# Patient Record
Sex: FEMALE | Race: WHITE | Employment: PART TIME | URBAN - NONMETROPOLITAN AREA
[De-identification: names, ages, dates, MRNs, and addresses within clinical notes are randomized per-mention and may not be internally consistent; named-entity substitution may affect disease eponyms.]

---

## 2017-01-04 ENCOUNTER — TELEPHONE (OUTPATIENT)
Dept: FAMILY MEDICINE CLINIC | Age: 62
End: 2017-01-04

## 2017-01-04 RX ORDER — ATORVASTATIN CALCIUM 20 MG/1
20 TABLET, FILM COATED ORAL DAILY
Qty: 90 TABLET | Refills: 1 | Status: SHIPPED | OUTPATIENT
Start: 2017-01-04 | End: 2017-05-10 | Stop reason: SDUPTHER

## 2017-05-10 ENCOUNTER — OFFICE VISIT (OUTPATIENT)
Dept: FAMILY MEDICINE CLINIC | Age: 62
End: 2017-05-10

## 2017-05-10 VITALS
WEIGHT: 185.8 LBS | BODY MASS INDEX: 30.96 KG/M2 | HEART RATE: 68 BPM | DIASTOLIC BLOOD PRESSURE: 86 MMHG | SYSTOLIC BLOOD PRESSURE: 132 MMHG | HEIGHT: 65 IN

## 2017-05-10 DIAGNOSIS — E78.00 PURE HYPERCHOLESTEROLEMIA: Primary | ICD-10-CM

## 2017-05-10 DIAGNOSIS — I10 ESSENTIAL HYPERTENSION: ICD-10-CM

## 2017-05-10 DIAGNOSIS — M15.9 PRIMARY OSTEOARTHRITIS INVOLVING MULTIPLE JOINTS: ICD-10-CM

## 2017-05-10 DIAGNOSIS — Z12.11 COLON CANCER SCREENING: ICD-10-CM

## 2017-05-10 PROCEDURE — 3017F COLORECTAL CA SCREEN DOC REV: CPT | Performed by: FAMILY MEDICINE

## 2017-05-10 PROCEDURE — 99213 OFFICE O/P EST LOW 20 MIN: CPT | Performed by: FAMILY MEDICINE

## 2017-05-10 PROCEDURE — 3014F SCREEN MAMMO DOC REV: CPT | Performed by: FAMILY MEDICINE

## 2017-05-10 PROCEDURE — G8417 CALC BMI ABV UP PARAM F/U: HCPCS | Performed by: FAMILY MEDICINE

## 2017-05-10 PROCEDURE — G8427 DOCREV CUR MEDS BY ELIG CLIN: HCPCS | Performed by: FAMILY MEDICINE

## 2017-05-10 PROCEDURE — 1036F TOBACCO NON-USER: CPT | Performed by: FAMILY MEDICINE

## 2017-05-10 RX ORDER — IRBESARTAN AND HYDROCHLOROTHIAZIDE 150; 12.5 MG/1; MG/1
1 TABLET, FILM COATED ORAL DAILY
Qty: 90 TABLET | Refills: 1 | Status: SHIPPED | OUTPATIENT
Start: 2017-05-10 | End: 2017-11-06 | Stop reason: SDUPTHER

## 2017-05-10 RX ORDER — ATORVASTATIN CALCIUM 20 MG/1
20 TABLET, FILM COATED ORAL DAILY
Qty: 90 TABLET | Refills: 1 | Status: SHIPPED | OUTPATIENT
Start: 2017-05-10 | End: 2017-05-12 | Stop reason: SDUPTHER

## 2017-05-12 ENCOUNTER — TELEPHONE (OUTPATIENT)
Dept: FAMILY MEDICINE CLINIC | Age: 62
End: 2017-05-12

## 2017-05-12 RX ORDER — ATORVASTATIN CALCIUM 40 MG/1
40 TABLET, FILM COATED ORAL DAILY
Qty: 90 TABLET | Refills: 1 | Status: SHIPPED | OUTPATIENT
Start: 2017-05-12 | End: 2018-01-15 | Stop reason: SDUPTHER

## 2017-05-17 ENCOUNTER — TELEPHONE (OUTPATIENT)
Dept: FAMILY MEDICINE CLINIC | Age: 62
End: 2017-05-17

## 2017-05-17 RX ORDER — AZITHROMYCIN 250 MG/1
TABLET, FILM COATED ORAL
Qty: 1 PACKET | Refills: 0 | Status: SHIPPED | OUTPATIENT
Start: 2017-05-17 | End: 2017-05-27

## 2017-05-24 PROCEDURE — 82274 ASSAY TEST FOR BLOOD FECAL: CPT | Performed by: FAMILY MEDICINE

## 2017-05-25 DIAGNOSIS — Z12.11 COLON CANCER SCREENING: ICD-10-CM

## 2017-05-25 LAB
CONTROL: PRESENT
HEMOCCULT STL QL: NEGATIVE

## 2017-05-26 ENCOUNTER — TELEPHONE (OUTPATIENT)
Dept: FAMILY MEDICINE CLINIC | Age: 62
End: 2017-05-26

## 2017-11-28 ENCOUNTER — TELEPHONE (OUTPATIENT)
Dept: FAMILY MEDICINE CLINIC | Age: 62
End: 2017-11-28

## 2017-11-28 ENCOUNTER — OFFICE VISIT (OUTPATIENT)
Dept: FAMILY MEDICINE CLINIC | Age: 62
End: 2017-11-28
Payer: COMMERCIAL

## 2017-11-28 VITALS
HEART RATE: 64 BPM | HEIGHT: 62 IN | WEIGHT: 191.8 LBS | SYSTOLIC BLOOD PRESSURE: 124 MMHG | TEMPERATURE: 98.6 F | BODY MASS INDEX: 35.3 KG/M2 | DIASTOLIC BLOOD PRESSURE: 86 MMHG

## 2017-11-28 DIAGNOSIS — J20.8 ACUTE BRONCHITIS DUE TO OTHER SPECIFIED ORGANISMS: Primary | ICD-10-CM

## 2017-11-28 PROCEDURE — G8417 CALC BMI ABV UP PARAM F/U: HCPCS | Performed by: FAMILY MEDICINE

## 2017-11-28 PROCEDURE — G8427 DOCREV CUR MEDS BY ELIG CLIN: HCPCS | Performed by: FAMILY MEDICINE

## 2017-11-28 PROCEDURE — G8484 FLU IMMUNIZE NO ADMIN: HCPCS | Performed by: FAMILY MEDICINE

## 2017-11-28 PROCEDURE — 3014F SCREEN MAMMO DOC REV: CPT | Performed by: FAMILY MEDICINE

## 2017-11-28 PROCEDURE — 99213 OFFICE O/P EST LOW 20 MIN: CPT | Performed by: FAMILY MEDICINE

## 2017-11-28 PROCEDURE — 3017F COLORECTAL CA SCREEN DOC REV: CPT | Performed by: FAMILY MEDICINE

## 2017-11-28 PROCEDURE — 1036F TOBACCO NON-USER: CPT | Performed by: FAMILY MEDICINE

## 2017-11-28 RX ORDER — AZITHROMYCIN 250 MG/1
TABLET, FILM COATED ORAL
Qty: 1 PACKET | Refills: 0 | Status: SHIPPED | OUTPATIENT
Start: 2017-11-28 | End: 2018-01-15

## 2017-11-28 ASSESSMENT — ENCOUNTER SYMPTOMS
SHORTNESS OF BREATH: 0
COUGH: 1
WHEEZING: 0

## 2017-11-28 NOTE — PROGRESS NOTES
[facility] PROFESSIONAL SERVS  [facility]  [address] [address]  Dept: [phone]  Dept Fax: [phone]  Loc: [phone]  PROGRESS NOTE      Visit Date: 11/28/2017    Miah Reid is a 63 y.o. female who presents today for:  Chief Complaint   Patient presents with    Cough     phlem yellow green    Otalgia     bilateral       Subjective:  HPI     Sore throat and chest congestion. Bilateral ear pain with some lightheadedness which has resolved. Productive cough yellow phlegm. Tried vicks rub and tylenol. Started 4 days ago. Review of Systems   Constitutional: Positive for chills. Negative for fever. Respiratory: Positive for cough. Negative for shortness of breath and wheezing. Past Medical History:   Diagnosis Date    Depression     Hyperlipidemia     Hypertension       Past Surgical History:   Procedure Laterality Date    CHOLECYSTECTOMY       History reviewed. No pertinent family history. Social History   Substance Use Topics    Smoking status: Never Smoker    Smokeless tobacco: Never Used    Alcohol use No      Current Outpatient Prescriptions   Medication Sig Dispense Refill    irbesartan-hydrochlorothiazide (AVALIDE) 150-12.5 MG per tablet TAKE 1 TABLET DAILY 90 tablet 0    atorvastatin (LIPITOR) 40 MG tablet Take 1 tablet by mouth daily 90 tablet 1     No current facility-administered medications for this visit.       Allergies   Allergen Reactions    Ace Inhibitors Other (See Comments)     cough    Diovan Hct [Valsartan-Hydrochlorothiazide]      Hands numbed up    Mevacor [Lovastatin] Other (See Comments)     myalgia    Wellbutrin [Bupropion] Anxiety     Health Maintenance   Topic Date Due    Hepatitis C screen  1955    HIV screen  05/29/1972    Diabetes screen  05/29/1997    DTaP/Tdap/Td vaccine (1 - Tdap) 06/20/1998    Cervical cancer screen  05/07/2015    Breast cancer screen  10/02/2016    Zostavax vaccine 2017    Flu vaccine (1) 2017    Colon Cancer Screen FIT/FOBT  2018    Lipid screen  2022       Objective:     /86 (Site: Left Arm, Position: Sitting, Cuff Size: Medium Adult)   Pulse 64   Temp 98.6 °F (37 °C) (Oral)   Ht 5' 2\" (1.575 m)   Wt 191 lb 12.8 oz (87 kg)   BMI 35.08 kg/m²   Physical Exam   Constitutional: She is oriented to person, place, and time. She appears well-developed and well-nourished. HENT:   Right Ear: External ear normal.   Left Ear: External ear normal.   Mouth/Throat: Oropharynx is clear and moist. No oropharyngeal exudate. Cardiovascular: Normal rate and regular rhythm. No murmur heard. Pulmonary/Chest: Effort normal and breath sounds normal. No respiratory distress. She has no wheezes. Neurological: She is alert and oriented to person, place, and time. Psychiatric: She has a normal mood and affect. Her behavior is normal.   Vitals reviewed. Harsh cough    Impression/Plan:  1. Acute bronchitis due to other specified organisms  New problem. Unlikely PNA. Will treat with z-rula  - azithromycin (ZITHROMAX) 250 MG tablet; Take 2 tabs (500 mg) on Day 1, and take 1 tab (250 mg) on days 2 through 5. Dispense: 1 packet; Refill: 0      They voiced understanding. All questions answered. They agreed with treatment plan. Educational materials given - see patient instructions. Discussed use, benefit, and side effects of prescribed medications. Reviewed health maintenance. Return if symptoms worsen or fail to improve.        Electronically signed by Drew Godoy MD on 2017 at 2:36 PM

## 2017-11-28 NOTE — PATIENT INSTRUCTIONS
good.  When should you call for help? Call 911 anytime you think you may need emergency care. For example, call if:  · You have severe trouble breathing. Call your doctor now or seek immediate medical care if:  · You have new or worse trouble breathing. · You cough up dark brown or bloody mucus (sputum). · You have a new or higher fever. · You have a new rash. Watch closely for changes in your health, and be sure to contact your doctor if:  · You cough more deeply or more often, especially if you notice more mucus or a change in the color of your mucus. · You are not getting better as expected. Where can you learn more? Go to https://LifeIMAGE.Social Media Networks. org and sign in to your [employer] account. Enter H333 in the FirmPlay box to learn more about \"Bronchitis: Care Instructions. \"     If you do not have an account, please click on the \"Sign Up Now\" link. Current as of: March 25, 2017  Content Version: 11.3  © 3865-6068 [employer]. Care instructions adapted under license by [facility] ([facility]). If you have questions about a medical condition or this instruction, always ask your healthcare professional. [place] [address] any warranty or liability for your use of this information.

## 2018-01-15 ENCOUNTER — OFFICE VISIT (OUTPATIENT)
Dept: FAMILY MEDICINE CLINIC | Age: 63
End: 2018-01-15
Payer: COMMERCIAL

## 2018-01-15 VITALS
SYSTOLIC BLOOD PRESSURE: 150 MMHG | DIASTOLIC BLOOD PRESSURE: 90 MMHG | HEART RATE: 66 BPM | WEIGHT: 194 LBS | BODY MASS INDEX: 35.7 KG/M2 | HEIGHT: 62 IN

## 2018-01-15 DIAGNOSIS — I10 ESSENTIAL HYPERTENSION: Primary | ICD-10-CM

## 2018-01-15 DIAGNOSIS — Z12.31 ENCOUNTER FOR SCREENING MAMMOGRAM FOR BREAST CANCER: ICD-10-CM

## 2018-01-15 DIAGNOSIS — E78.00 PURE HYPERCHOLESTEROLEMIA: Chronic | ICD-10-CM

## 2018-01-15 PROCEDURE — 3017F COLORECTAL CA SCREEN DOC REV: CPT | Performed by: FAMILY MEDICINE

## 2018-01-15 PROCEDURE — G8417 CALC BMI ABV UP PARAM F/U: HCPCS | Performed by: FAMILY MEDICINE

## 2018-01-15 PROCEDURE — 99214 OFFICE O/P EST MOD 30 MIN: CPT | Performed by: FAMILY MEDICINE

## 2018-01-15 PROCEDURE — 3014F SCREEN MAMMO DOC REV: CPT | Performed by: FAMILY MEDICINE

## 2018-01-15 PROCEDURE — G8427 DOCREV CUR MEDS BY ELIG CLIN: HCPCS | Performed by: FAMILY MEDICINE

## 2018-01-15 PROCEDURE — 1036F TOBACCO NON-USER: CPT | Performed by: FAMILY MEDICINE

## 2018-01-15 PROCEDURE — G8484 FLU IMMUNIZE NO ADMIN: HCPCS | Performed by: FAMILY MEDICINE

## 2018-01-15 RX ORDER — IRBESARTAN AND HYDROCHLOROTHIAZIDE 150; 12.5 MG/1; MG/1
TABLET, FILM COATED ORAL
Qty: 90 TABLET | Refills: 1 | Status: SHIPPED | OUTPATIENT
Start: 2018-01-15 | End: 2018-06-27 | Stop reason: SDUPTHER

## 2018-01-15 RX ORDER — ATORVASTATIN CALCIUM 40 MG/1
40 TABLET, FILM COATED ORAL DAILY
Qty: 90 TABLET | Refills: 1 | Status: SHIPPED | OUTPATIENT
Start: 2018-01-15 | End: 2018-07-18 | Stop reason: SDUPTHER

## 2018-01-15 ASSESSMENT — ENCOUNTER SYMPTOMS
VOMITING: 0
CHEST TIGHTNESS: 0
NAUSEA: 0
EYE REDNESS: 0
COLOR CHANGE: 0
EYE DISCHARGE: 0
SHORTNESS OF BREATH: 0

## 2018-01-15 ASSESSMENT — PATIENT HEALTH QUESTIONNAIRE - PHQ9
1. LITTLE INTEREST OR PLEASURE IN DOING THINGS: 0
2. FEELING DOWN, DEPRESSED OR HOPELESS: 0
SUM OF ALL RESPONSES TO PHQ9 QUESTIONS 1 & 2: 0
SUM OF ALL RESPONSES TO PHQ QUESTIONS 1-9: 0

## 2018-01-15 NOTE — PROGRESS NOTES
daily    Encounter for screening mammogram for breast cancer  -     [facility] DIGITAL SCREEN W CAD BILATERAL; Future    BP is elevated today, but patient believes it is because of a new stressor today. Will continue on current medication and have patient return in a week for a nurse BP check. Order for screening mammogram given. Lisa received counseling on the following healthy behaviors: nutrition, exercise and medication adherence  Reviewed prior labs and health maintenance  Continue current medications, diet and exercise. Discussed use, benefit, and side effects of prescribed medications. Barriers to medication compliance addressed. Patient given educational materials - see patient instructions    Requested Prescriptions     Signed Prescriptions Disp Refills    irbesartan-hydrochlorothiazide (AVALIDE) 150-12.5 MG per tablet 90 tablet 1     Sig: TAKE 1 TABLET DAILY    atorvastatin (LIPITOR) 40 MG tablet 90 tablet 1     Sig: Take 1 tablet by mouth daily       All patient questions answered. Patient voiced understanding. Quality Measures    Body mass index is 35.48 kg/m². Elevated. Weight control planned discussed Healthy diet and regular exercise. BP: (!) 150/90 Blood pressure is elevated. Treatment plan consists of No treatment change needed. Will monitor closely. Lab Results   Component Value Date    LDLCALC 86 11/04/2017    (goal LDL reduction with dx if diabetes is 50% LDL reduction)      PHQ Scores 1/15/2018 5/10/2016   PHQ2 Score 0 1   PHQ9 Score 0 1     Interpretation of Total Score Depression Severity: 1-4 = Minimal depression, 5-9 = Mild depression, 10-14 = Moderate depression, 15-19 = Moderately severe depression, 20-27 = Severe depression    Return in about 1 week (around 1/22/2018) for nurse BP check; 6 months with PCP.     Electronically signed by Arianna Levy MD on 1/15/2018 at 4:04 PM

## 2018-03-14 ENCOUNTER — OFFICE VISIT (OUTPATIENT)
Dept: FAMILY MEDICINE CLINIC | Age: 63
End: 2018-03-14
Payer: COMMERCIAL

## 2018-03-14 VITALS
HEART RATE: 80 BPM | DIASTOLIC BLOOD PRESSURE: 82 MMHG | WEIGHT: 187.6 LBS | HEIGHT: 65 IN | BODY MASS INDEX: 31.25 KG/M2 | SYSTOLIC BLOOD PRESSURE: 146 MMHG

## 2018-03-14 DIAGNOSIS — R73.01 ELEVATED FASTING GLUCOSE: ICD-10-CM

## 2018-03-14 DIAGNOSIS — F41.9 ANXIETY: Primary | ICD-10-CM

## 2018-03-14 PROCEDURE — 99213 OFFICE O/P EST LOW 20 MIN: CPT | Performed by: FAMILY MEDICINE

## 2018-03-14 PROCEDURE — G8427 DOCREV CUR MEDS BY ELIG CLIN: HCPCS | Performed by: FAMILY MEDICINE

## 2018-03-14 PROCEDURE — 3014F SCREEN MAMMO DOC REV: CPT | Performed by: FAMILY MEDICINE

## 2018-03-14 PROCEDURE — G8417 CALC BMI ABV UP PARAM F/U: HCPCS | Performed by: FAMILY MEDICINE

## 2018-03-14 PROCEDURE — 3017F COLORECTAL CA SCREEN DOC REV: CPT | Performed by: FAMILY MEDICINE

## 2018-03-14 PROCEDURE — G8484 FLU IMMUNIZE NO ADMIN: HCPCS | Performed by: FAMILY MEDICINE

## 2018-03-14 PROCEDURE — 1036F TOBACCO NON-USER: CPT | Performed by: FAMILY MEDICINE

## 2018-03-14 RX ORDER — FLUOXETINE 10 MG/1
10 CAPSULE ORAL DAILY
Qty: 30 CAPSULE | Refills: 0 | Status: SHIPPED | OUTPATIENT
Start: 2018-03-14 | End: 2018-04-09 | Stop reason: SDUPTHER

## 2018-03-17 LAB
AVERAGE GLUCOSE: 131 MG/DL (ref 66–114)
HBA1C MFR BLD: 6.2 % (ref 4.2–5.8)
TSH SERPL DL<=0.05 MIU/L-ACNC: 1.96 UIU/ML (ref 0.4–4.1)

## 2018-03-19 PROBLEM — R73.03 PRE-DIABETES: Status: ACTIVE | Noted: 2018-03-19

## 2018-04-09 DIAGNOSIS — F41.9 ANXIETY: ICD-10-CM

## 2018-04-09 RX ORDER — FLUOXETINE 10 MG/1
10 CAPSULE ORAL DAILY
Qty: 90 CAPSULE | Refills: 0 | Status: SHIPPED | OUTPATIENT
Start: 2018-04-09 | End: 2018-04-18 | Stop reason: SDUPTHER

## 2018-04-18 ENCOUNTER — OFFICE VISIT (OUTPATIENT)
Dept: FAMILY MEDICINE CLINIC | Age: 63
End: 2018-04-18
Payer: COMMERCIAL

## 2018-04-18 VITALS
SYSTOLIC BLOOD PRESSURE: 130 MMHG | DIASTOLIC BLOOD PRESSURE: 70 MMHG | BODY MASS INDEX: 31.56 KG/M2 | HEIGHT: 65 IN | WEIGHT: 189.4 LBS | HEART RATE: 76 BPM

## 2018-04-18 DIAGNOSIS — F41.9 ANXIETY: ICD-10-CM

## 2018-04-18 PROCEDURE — G8427 DOCREV CUR MEDS BY ELIG CLIN: HCPCS | Performed by: FAMILY MEDICINE

## 2018-04-18 PROCEDURE — 99213 OFFICE O/P EST LOW 20 MIN: CPT | Performed by: FAMILY MEDICINE

## 2018-04-18 PROCEDURE — G8417 CALC BMI ABV UP PARAM F/U: HCPCS | Performed by: FAMILY MEDICINE

## 2018-04-18 PROCEDURE — 3014F SCREEN MAMMO DOC REV: CPT | Performed by: FAMILY MEDICINE

## 2018-04-18 PROCEDURE — 1036F TOBACCO NON-USER: CPT | Performed by: FAMILY MEDICINE

## 2018-04-18 PROCEDURE — 3017F COLORECTAL CA SCREEN DOC REV: CPT | Performed by: FAMILY MEDICINE

## 2018-04-18 RX ORDER — FLUOXETINE 10 MG/1
10 CAPSULE ORAL DAILY
Qty: 90 CAPSULE | Refills: 0 | Status: SHIPPED | OUTPATIENT
Start: 2018-04-18 | End: 2018-07-04

## 2018-06-27 DIAGNOSIS — I10 ESSENTIAL HYPERTENSION: ICD-10-CM

## 2018-06-27 RX ORDER — IRBESARTAN AND HYDROCHLOROTHIAZIDE 150; 12.5 MG/1; MG/1
TABLET, FILM COATED ORAL
Qty: 90 TABLET | Refills: 1 | Status: SHIPPED | OUTPATIENT
Start: 2018-06-27 | End: 2018-12-24 | Stop reason: SDUPTHER

## 2018-07-04 ENCOUNTER — HOSPITAL ENCOUNTER (EMERGENCY)
Age: 63
Discharge: HOME | End: 2018-07-04
Payer: COMMERCIAL

## 2018-07-04 VITALS
OXYGEN SATURATION: 96 % | BODY MASS INDEX: 30.55 KG/M2 | WEIGHT: 185 LBS | SYSTOLIC BLOOD PRESSURE: 169 MMHG | DIASTOLIC BLOOD PRESSURE: 89 MMHG | RESPIRATION RATE: 20 BRPM | HEART RATE: 72 BPM | TEMPERATURE: 97.8 F

## 2018-07-04 DIAGNOSIS — M17.12 OSTEOARTHRITIS OF LEFT KNEE, UNSPECIFIED OSTEOARTHRITIS TYPE: Primary | ICD-10-CM

## 2018-07-04 PROCEDURE — 99212 OFFICE O/P EST SF 10 MIN: CPT | Performed by: NURSE PRACTITIONER

## 2018-07-04 PROCEDURE — 99213 OFFICE O/P EST LOW 20 MIN: CPT

## 2018-07-04 RX ORDER — LIDOCAINE 50 MG/G
OINTMENT TOPICAL
Qty: 1 TUBE | Refills: 0 | Status: SHIPPED | OUTPATIENT
Start: 2018-07-04 | End: 2019-01-16 | Stop reason: ALTCHOICE

## 2018-07-04 RX ORDER — TRAMADOL HYDROCHLORIDE 50 MG/1
50 TABLET ORAL EVERY 6 HOURS PRN
Qty: 12 TABLET | Refills: 0 | Status: SHIPPED | OUTPATIENT
Start: 2018-07-04 | End: 2018-07-07

## 2018-07-04 ASSESSMENT — PAIN DESCRIPTION - DESCRIPTORS: DESCRIPTORS: SHOOTING

## 2018-07-04 ASSESSMENT — PAIN DESCRIPTION - LOCATION: LOCATION: KNEE

## 2018-07-04 ASSESSMENT — PAIN SCALES - GENERAL: PAINLEVEL_OUTOF10: 10

## 2018-07-04 ASSESSMENT — PAIN DESCRIPTION - ORIENTATION: ORIENTATION: LEFT

## 2018-07-04 ASSESSMENT — ENCOUNTER SYMPTOMS
CHOKING: 0
SHORTNESS OF BREATH: 0
VOMITING: 0
NAUSEA: 0
ABDOMINAL PAIN: 0

## 2018-07-04 ASSESSMENT — PAIN DESCRIPTION - PAIN TYPE: TYPE: ACUTE PAIN

## 2018-07-04 NOTE — ED PROVIDER NOTES
[facility]  Urgent Care Encounter       CHIEF COMPLAINT       Chief Complaint   Patient presents with    Knee Pain     Pt having pain in left knee. Getting worse. Nurses Notes reviewed and I agree except as noted in the HPI. HISTORY OF PRESENT ILLNESS   Sotero Garcia is a 66 y.o. female who presents For evaluation of left knee pain. Patient reports that this has been an ongoing issue for multiple ones, however the symptoms seem to have increased over the past 2 weeks. She states that she has an appointment [facility] [place] that is scheduled for 8 days from now. She reports that the pain gets worse the more she is on her leg she has been using ibuprofen, muscle rubs, ice and heat at home without much relief. She denies any injury or trauma to the knee. The history is provided by the patient. REVIEW OF SYSTEMS     Review of Systems   Constitutional: Negative for chills and fever. Respiratory: Negative for choking and shortness of breath. Cardiovascular: Negative for chest pain. Gastrointestinal: Negative for abdominal pain, nausea and vomiting. Genitourinary: Negative for dysuria and urgency. Musculoskeletal: Positive for arthralgias and gait problem. Skin: Negative for rash. Neurological: Negative for weakness and numbness. PAST MEDICAL HISTORY         Diagnosis Date    Depression     Hyperlipidemia     Hypertension        SURGICAL HISTORY     Patient  has a past surgical history that includes Cholecystectomy. CURRENT MEDICATIONS       Previous Medications    ATORVASTATIN (LIPITOR) 40 MG TABLET    Take 1 tablet by mouth daily    IRBESARTAN-HYDROCHLOROTHIAZIDE (AVALIDE) 150-12.5 MG PER TABLET    TAKE 1 TABLET DAILY       ALLERGIES     Patient is is allergic to ace inhibitors; diovan hct [valsartan-hydrochlorothiazide]; mevacor [lovastatin]; and wellbutrin [bupropion].     Patients   Immunization History   Administered Date(s) Administered    Td 06/19/1998       FAMILY HISTORY     Patient's family history is not on file. SOCIAL HISTORY     Patient  reports that she has never smoked. She has never used smokeless tobacco. She reports that she does not drink alcohol or use drugs. PHYSICAL EXAM     ED TRIAGE VITALS  BP: (!) 169/89, Temp: 97.8 °F (36.6 °C), Pulse: 72, Resp: 20, SpO2: 96 %,Estimated body mass index is 30.55 kg/m² as calculated from the following:    Height as of 4/18/18: 5' 5.25\" (1.657 m). Weight as of this encounter: 185 lb (83.9 kg). ,No LMP recorded. Patient is postmenopausal.    Physical Exam   Constitutional: She is oriented to person, place, and time. She appears well-developed and well-nourished. No distress. Pulmonary/Chest: Effort normal. No respiratory distress. Musculoskeletal:        Left knee: She exhibits normal range of motion and no swelling. Tenderness found. Medial joint line tenderness noted. Neurological: She is alert and oriented to person, place, and time. No cranial nerve deficit. Skin: Skin is warm and dry. No rash noted. She is not diaphoretic. Psychiatric: She has a normal mood and affect. Nursing note and vitals reviewed. DIAGNOSTIC RESULTS   Labs:No results found for this visit on 07/04/18. IMAGING:    No orders to display         EKG:None  URGENT CARE COURSE:     Vitals:    07/04/18 1209   BP: (!) 169/89   Pulse: 72   Resp: 20   Temp: 97.8 °F (36.6 °C)   TempSrc: Oral   SpO2: 96%   Weight: 185 lb (83.9 kg)       Medications - No data to display         PROCEDURES:  None    FINAL IMPRESSION      1. Osteoarthritis of right knee, unspecified osteoarthritis type          DISPOSITION/PLAN   DISPOSITION Decision To Discharge 07/04/2018 12:29:47 PM    Physical exam patient history are consistent with osteoarthritis type knee pain. The patient is instructed to continue taking Tylenol and ibuprofen at home and will be given a prescription for lidocaine ointment.   She is also

## 2018-07-18 ENCOUNTER — OFFICE VISIT (OUTPATIENT)
Dept: FAMILY MEDICINE CLINIC | Age: 63
End: 2018-07-18
Payer: COMMERCIAL

## 2018-07-18 VITALS
BODY MASS INDEX: 31.89 KG/M2 | HEIGHT: 65 IN | WEIGHT: 191.4 LBS | HEART RATE: 76 BPM | DIASTOLIC BLOOD PRESSURE: 84 MMHG | SYSTOLIC BLOOD PRESSURE: 134 MMHG

## 2018-07-18 DIAGNOSIS — R73.03 PRE-DIABETES: ICD-10-CM

## 2018-07-18 DIAGNOSIS — Z12.11 SCREENING FOR COLON CANCER: ICD-10-CM

## 2018-07-18 DIAGNOSIS — I10 ESSENTIAL HYPERTENSION: Primary | ICD-10-CM

## 2018-07-18 DIAGNOSIS — E78.00 PURE HYPERCHOLESTEROLEMIA: Chronic | ICD-10-CM

## 2018-07-18 PROCEDURE — G8417 CALC BMI ABV UP PARAM F/U: HCPCS | Performed by: FAMILY MEDICINE

## 2018-07-18 PROCEDURE — G8427 DOCREV CUR MEDS BY ELIG CLIN: HCPCS | Performed by: FAMILY MEDICINE

## 2018-07-18 PROCEDURE — 3017F COLORECTAL CA SCREEN DOC REV: CPT | Performed by: FAMILY MEDICINE

## 2018-07-18 PROCEDURE — 1036F TOBACCO NON-USER: CPT | Performed by: FAMILY MEDICINE

## 2018-07-18 PROCEDURE — 99213 OFFICE O/P EST LOW 20 MIN: CPT | Performed by: FAMILY MEDICINE

## 2018-07-18 RX ORDER — IRBESARTAN AND HYDROCHLOROTHIAZIDE 150; 12.5 MG/1; MG/1
TABLET, FILM COATED ORAL
Qty: 90 TABLET | Refills: 1 | Status: CANCELLED | OUTPATIENT
Start: 2018-07-18

## 2018-07-18 RX ORDER — ATORVASTATIN CALCIUM 40 MG/1
40 TABLET, FILM COATED ORAL DAILY
Qty: 90 TABLET | Refills: 1 | Status: SHIPPED | OUTPATIENT
Start: 2018-07-18 | End: 2019-01-16 | Stop reason: SDUPTHER

## 2018-07-18 ASSESSMENT — ENCOUNTER SYMPTOMS
SHORTNESS OF BREATH: 0
WHEEZING: 0

## 2018-07-18 NOTE — PROGRESS NOTES
 HIV screen  05/29/1972    DTaP/Tdap/Td vaccine (1 - Tdap) 06/20/1998    Shingles Vaccine (1 of 2 - 2 Dose Series) 05/29/2007    Cervical cancer screen  05/07/2015    Breast cancer screen  10/02/2016    Colon Cancer Screen FIT/FOBT  05/24/2018    Flu vaccine (1) 09/01/2018    Potassium monitoring  11/04/2018    Creatinine monitoring  11/04/2018    A1C test (Diabetic or Prediabetic)  03/16/2019    Lipid screen  11/04/2022       Objective:  /84 (Site: Left Arm, Position: Sitting, Cuff Size: Large Adult)   Pulse 76   Ht 5' 5.25\" (1.657 m)   Wt 191 lb 6.4 oz (86.8 kg)   BMI 31.61 kg/m²   Physical Exam   Constitutional: She is oriented to person, place, and time. She appears well-developed and well-nourished. Cardiovascular: Normal rate, regular rhythm and normal heart sounds. No murmur heard. Pulmonary/Chest: Effort normal and breath sounds normal. No respiratory distress. She has no wheezes. Neurological: She is alert and oriented to person, place, and time. Psychiatric: She has a normal mood and affect. Her speech is normal and behavior is normal. Thought content normal.   Vitals reviewed. Lab Results   Component Value Date    WBC 9.4 11/04/2017    HGB 13.3 11/04/2017    HCT 39.2 11/04/2017     11/04/2017    CHOL 201 (H) 11/04/2017    TRIG 273 (H) 11/04/2017    HDL 60 11/04/2017    ALT 29 11/04/2017    AST 24 11/04/2017     11/04/2017    K 3.8 11/04/2017     11/04/2017    CREATININE 0.74 11/04/2017    BUN 12 11/04/2017    CO2 27 11/04/2017    TSH 1.96 03/16/2018    LABA1C 6.2 (H) 03/16/2018       Impression/Plan:  1. Essential hypertension  Well-controlled. Chronic condition. Continue avalide. 2. Pure hypercholesterolemia  partially controlled. Chronic condition. -refill atorvastatin (LIPITOR) 40 MG tablet; Take 1 tablet by mouth daily  Dispense: 90 tablet; Refill: 1    3. Pre-diabetes  Controlled. Diet and exercise.     4. Screening for colon cancer  - POCT FECAL IMMUNOCHEMICAL TEST (FIT); Future    They voiced understanding. All questions answered. They agreed with treatment plan. See patient instructions for any educational materials that may have been given. Discussed use, benefit, and side effects of prescribed medications. Reviewed health maintenance. (Please note that portions of this note may have been completed with a voice recognition program.  Efforts were made to edit the dictation but occasionally words are mis-transcribed.)    Return in about 6 months (around 1/18/2019) for HTN.       Electronically signed by Josefa Alfredo MD on 7/18/2018 at 4:10 PM

## 2018-10-10 ENCOUNTER — TELEPHONE (OUTPATIENT)
Dept: OTHER | Facility: CLINIC | Age: 63
End: 2018-10-10

## 2018-12-24 DIAGNOSIS — I10 ESSENTIAL HYPERTENSION: ICD-10-CM

## 2018-12-24 RX ORDER — IRBESARTAN AND HYDROCHLOROTHIAZIDE 150; 12.5 MG/1; MG/1
TABLET, FILM COATED ORAL
Qty: 90 TABLET | Refills: 1 | Status: SHIPPED | OUTPATIENT
Start: 2018-12-24 | End: 2019-05-31 | Stop reason: SDUPTHER

## 2019-01-16 ENCOUNTER — OFFICE VISIT (OUTPATIENT)
Dept: FAMILY MEDICINE CLINIC | Age: 64
End: 2019-01-16
Payer: COMMERCIAL

## 2019-01-16 VITALS
DIASTOLIC BLOOD PRESSURE: 84 MMHG | HEIGHT: 65 IN | BODY MASS INDEX: 31.75 KG/M2 | WEIGHT: 190.6 LBS | SYSTOLIC BLOOD PRESSURE: 134 MMHG | HEART RATE: 72 BPM

## 2019-01-16 DIAGNOSIS — R73.01 ELEVATED FASTING GLUCOSE: ICD-10-CM

## 2019-01-16 DIAGNOSIS — I10 ESSENTIAL HYPERTENSION: Primary | ICD-10-CM

## 2019-01-16 DIAGNOSIS — E78.00 PURE HYPERCHOLESTEROLEMIA: Chronic | ICD-10-CM

## 2019-01-16 DIAGNOSIS — R73.03 PRE-DIABETES: ICD-10-CM

## 2019-01-16 PROCEDURE — 99214 OFFICE O/P EST MOD 30 MIN: CPT | Performed by: FAMILY MEDICINE

## 2019-01-16 PROCEDURE — G8427 DOCREV CUR MEDS BY ELIG CLIN: HCPCS | Performed by: FAMILY MEDICINE

## 2019-01-16 PROCEDURE — 1036F TOBACCO NON-USER: CPT | Performed by: FAMILY MEDICINE

## 2019-01-16 PROCEDURE — G8484 FLU IMMUNIZE NO ADMIN: HCPCS | Performed by: FAMILY MEDICINE

## 2019-01-16 PROCEDURE — G8417 CALC BMI ABV UP PARAM F/U: HCPCS | Performed by: FAMILY MEDICINE

## 2019-01-16 PROCEDURE — 3017F COLORECTAL CA SCREEN DOC REV: CPT | Performed by: FAMILY MEDICINE

## 2019-01-16 RX ORDER — ATORVASTATIN CALCIUM 40 MG/1
40 TABLET, FILM COATED ORAL DAILY
Qty: 90 TABLET | Refills: 1 | Status: SHIPPED | OUTPATIENT
Start: 2019-01-16 | End: 2019-07-17 | Stop reason: SDUPTHER

## 2019-01-16 ASSESSMENT — ENCOUNTER SYMPTOMS
SHORTNESS OF BREATH: 0
WHEEZING: 0

## 2019-03-12 ENCOUNTER — OFFICE VISIT (OUTPATIENT)
Dept: FAMILY MEDICINE CLINIC | Age: 64
End: 2019-03-12
Payer: COMMERCIAL

## 2019-03-12 VITALS
BODY MASS INDEX: 32.22 KG/M2 | DIASTOLIC BLOOD PRESSURE: 80 MMHG | SYSTOLIC BLOOD PRESSURE: 126 MMHG | WEIGHT: 193.4 LBS | HEART RATE: 83 BPM | HEIGHT: 65 IN

## 2019-03-12 DIAGNOSIS — H65.191 ACUTE MEE (MIDDLE EAR EFFUSION), RIGHT: Primary | ICD-10-CM

## 2019-03-12 PROCEDURE — 1036F TOBACCO NON-USER: CPT | Performed by: FAMILY MEDICINE

## 2019-03-12 PROCEDURE — G8417 CALC BMI ABV UP PARAM F/U: HCPCS | Performed by: FAMILY MEDICINE

## 2019-03-12 PROCEDURE — G8427 DOCREV CUR MEDS BY ELIG CLIN: HCPCS | Performed by: FAMILY MEDICINE

## 2019-03-12 PROCEDURE — 3017F COLORECTAL CA SCREEN DOC REV: CPT | Performed by: FAMILY MEDICINE

## 2019-03-12 PROCEDURE — G8484 FLU IMMUNIZE NO ADMIN: HCPCS | Performed by: FAMILY MEDICINE

## 2019-03-12 PROCEDURE — 99213 OFFICE O/P EST LOW 20 MIN: CPT | Performed by: FAMILY MEDICINE

## 2019-03-12 RX ORDER — AMOXICILLIN 500 MG/1
1 TABLET, FILM COATED ORAL 2 TIMES DAILY
Qty: 20 TABLET | Refills: 0 | Status: SHIPPED | OUTPATIENT
Start: 2019-03-12 | End: 2019-07-17

## 2019-03-12 ASSESSMENT — PATIENT HEALTH QUESTIONNAIRE - PHQ9
1. LITTLE INTEREST OR PLEASURE IN DOING THINGS: 0
SUM OF ALL RESPONSES TO PHQ9 QUESTIONS 1 & 2: 0
2. FEELING DOWN, DEPRESSED OR HOPELESS: 0
SUM OF ALL RESPONSES TO PHQ QUESTIONS 1-9: 0
SUM OF ALL RESPONSES TO PHQ QUESTIONS 1-9: 0

## 2019-03-12 ASSESSMENT — ENCOUNTER SYMPTOMS
SORE THROAT: 0
SINUS PAIN: 0

## 2019-03-14 LAB
ABSOLUTE BASO #: 0 K/UL (ref 0–0.1)
ABSOLUTE EOS #: 0.1 K/UL (ref 0.1–0.4)
ABSOLUTE LYMPH #: 2.6 K/UL (ref 0.8–5.2)
ABSOLUTE MONO #: 0.6 K/UL (ref 0.1–0.9)
ABSOLUTE NEUT #: 3.3 K/UL (ref 1.3–9.1)
ALBUMIN SERPL-MCNC: 4.7 G/DL (ref 3.5–5.2)
ALK PHOSPHATASE: 79 U/L (ref 30–121)
ALT SERPL-CCNC: 24 U/L (ref 5–40)
ANION GAP SERPL CALCULATED.3IONS-SCNC: 10 MEQ/L (ref 10–19)
AST SERPL-CCNC: 22 U/L (ref 9–40)
BASOPHILS RELATIVE PERCENT: 0.6 %
BILIRUB SERPL-MCNC: 0.5 MG/DL
BUN BLDV-MCNC: 12 MG/DL (ref 8–23)
CALCIUM SERPL-MCNC: 9.9 MG/DL (ref 8.5–10.5)
CHLORIDE BLD-SCNC: 101 MEQ/L (ref 95–107)
CHOLESTEROL/HDL RATIO: 6.1
CHOLESTEROL: 278 MG/DL
CO2: 29 MEQ/L (ref 19–31)
CREAT SERPL-MCNC: 0.8 MG/DL (ref 0.6–1.3)
EGFR AFRICAN AMERICAN: 92.2 ML/MIN/1.73 M2
EGFR IF NONAFRICAN AMERICAN: 79.6 ML/MIN/1.73 M2
EOSINOPHILS RELATIVE PERCENT: 1.8 %
GLUCOSE: 126 MG/DL (ref 70–99)
HCT VFR BLD CALC: 39 % (ref 36–48)
HDLC SERPL-MCNC: 45.9 MG/DL
HEMOGLOBIN: 13.2 G/DL (ref 12–16)
LDL CHOLESTEROL CALCULATED: 179 MG/DL
LDL/HDL RATIO: 3.9
LYMPHOCYTE %: 38.6 %
MCH RBC QN AUTO: 29.3 PG (ref 27–34)
MCHC RBC AUTO-ENTMCNC: 33.8 G/DL (ref 31–36)
MCV RBC AUTO: 86.7 FL (ref 80–100)
MONOCYTES # BLD: 9.2 %
NEUTROPHILS RELATIVE PERCENT: 49.6 %
PDW BLD-RTO: 13.2 % (ref 10.8–14.8)
PLATELETS: 400 K/UL (ref 150–450)
POTASSIUM SERPL-SCNC: 4.7 MEQ/L (ref 3.5–5.4)
RBC: 4.5 M/UL (ref 4–5.5)
SODIUM BLD-SCNC: 140 MEQ/L (ref 135–146)
TOTAL PROTEIN: 7.4 G/DL (ref 6.1–8.3)
TRIGL SERPL-MCNC: 265 MG/DL
VLDLC SERPL CALC-MCNC: 53 MG/DL
WBC: 6.7 K/UL (ref 3.7–10.8)

## 2019-03-15 LAB
AVERAGE GLUCOSE: 128 MG/DL (ref 66–114)
HBA1C MFR BLD: 6.1 %

## 2019-03-19 DIAGNOSIS — Z12.11 SCREENING FOR COLON CANCER: ICD-10-CM

## 2019-03-19 LAB
CONTROL: PRESENT
HEMOCCULT STL QL: NEGATIVE

## 2019-03-19 PROCEDURE — 82274 ASSAY TEST FOR BLOOD FECAL: CPT | Performed by: FAMILY MEDICINE

## 2019-05-31 DIAGNOSIS — I10 ESSENTIAL HYPERTENSION: ICD-10-CM

## 2019-05-31 RX ORDER — IRBESARTAN AND HYDROCHLOROTHIAZIDE 150; 12.5 MG/1; MG/1
TABLET, FILM COATED ORAL
Qty: 90 TABLET | Refills: 1 | Status: SHIPPED | OUTPATIENT
Start: 2019-05-31 | End: 2019-07-17 | Stop reason: SDUPTHER

## 2019-07-17 ENCOUNTER — OFFICE VISIT (OUTPATIENT)
Dept: FAMILY MEDICINE CLINIC | Age: 64
End: 2019-07-17
Payer: COMMERCIAL

## 2019-07-17 VITALS
BODY MASS INDEX: 32.26 KG/M2 | DIASTOLIC BLOOD PRESSURE: 72 MMHG | HEART RATE: 64 BPM | SYSTOLIC BLOOD PRESSURE: 132 MMHG | HEIGHT: 65 IN | WEIGHT: 193.6 LBS

## 2019-07-17 DIAGNOSIS — M25.562 CHRONIC PAIN OF LEFT KNEE: ICD-10-CM

## 2019-07-17 DIAGNOSIS — G89.29 CHRONIC PAIN OF LEFT KNEE: ICD-10-CM

## 2019-07-17 DIAGNOSIS — R73.03 PRE-DIABETES: ICD-10-CM

## 2019-07-17 DIAGNOSIS — I10 ESSENTIAL HYPERTENSION: Primary | ICD-10-CM

## 2019-07-17 DIAGNOSIS — L98.9 SKIN LESION OF RIGHT ARM: ICD-10-CM

## 2019-07-17 DIAGNOSIS — E78.00 PURE HYPERCHOLESTEROLEMIA: ICD-10-CM

## 2019-07-17 DIAGNOSIS — M70.50 PES ANSERINE BURSITIS: ICD-10-CM

## 2019-07-17 PROCEDURE — G8417 CALC BMI ABV UP PARAM F/U: HCPCS | Performed by: FAMILY MEDICINE

## 2019-07-17 PROCEDURE — 99214 OFFICE O/P EST MOD 30 MIN: CPT | Performed by: FAMILY MEDICINE

## 2019-07-17 PROCEDURE — G8427 DOCREV CUR MEDS BY ELIG CLIN: HCPCS | Performed by: FAMILY MEDICINE

## 2019-07-17 PROCEDURE — 1036F TOBACCO NON-USER: CPT | Performed by: FAMILY MEDICINE

## 2019-07-17 PROCEDURE — 3017F COLORECTAL CA SCREEN DOC REV: CPT | Performed by: FAMILY MEDICINE

## 2019-07-17 RX ORDER — EZETIMIBE 10 MG/1
10 TABLET ORAL DAILY
Qty: 90 TABLET | Refills: 1 | Status: SHIPPED | OUTPATIENT
Start: 2019-07-17 | End: 2019-10-07 | Stop reason: SINTOL

## 2019-07-17 RX ORDER — ATORVASTATIN CALCIUM 40 MG/1
40 TABLET, FILM COATED ORAL DAILY
Qty: 90 TABLET | Refills: 1 | Status: SHIPPED | OUTPATIENT
Start: 2019-07-17 | End: 2020-01-06 | Stop reason: SDUPTHER

## 2019-07-17 RX ORDER — IRBESARTAN AND HYDROCHLOROTHIAZIDE 150; 12.5 MG/1; MG/1
1 TABLET, FILM COATED ORAL DAILY
Qty: 90 TABLET | Refills: 1 | Status: SHIPPED | OUTPATIENT
Start: 2019-07-17 | End: 2020-01-06 | Stop reason: SDUPTHER

## 2019-07-17 ASSESSMENT — ENCOUNTER SYMPTOMS
SHORTNESS OF BREATH: 0
WHEEZING: 0

## 2019-07-17 NOTE — PATIENT INSTRUCTIONS
Patient Education        High Cholesterol: Care Instructions  Your Care Instructions    Cholesterol is a type of fat in your blood. It is needed for many body functions, such as making new cells. Cholesterol is made by your body. It also comes from food you eat. High cholesterol means that you have too much of the fat in your blood. This raises your risk of a heart attack and stroke. LDL and HDL are part of your total cholesterol. LDL is the \"bad\" cholesterol. High LDL can raise your risk for heart disease, heart attack, and stroke. HDL is the \"good\" cholesterol. It helps clear bad cholesterol from the body. High HDL is linked with a lower risk of heart disease, heart attack, and stroke. Your cholesterol levels help your doctor find out your risk for having a heart attack or stroke. You and your doctor can talk about whether you need to lower your risk and what treatment is best for you. A heart-healthy lifestyle along with medicines can help lower your cholesterol and your risk. The way you choose to lower your risk will depend on how high your risk is for heart attack and stroke. It will also depend on how you feel about taking medicines. Follow-up care is a key part of your treatment and safety. Be sure to make and go to all appointments, and call your doctor if you are having problems. It's also a good idea to know your test results and keep a list of the medicines you take. How can you care for yourself at home? · Eat a variety of foods every day. Good choices include fruits, vegetables, whole grains (like oatmeal), dried beans and peas, nuts and seeds, soy products (like tofu), and fat-free or low-fat dairy products. · Replace butter, margarine, and hydrogenated or partially hydrogenated oils with olive and canola oils. (Canola oil margarine without trans fat is fine.)  · Replace red meat with fish, poultry, and soy protein (like tofu).   · Limit processed and packaged foods like chips, crackers, and cookies. · Bake, broil, or steam foods. Don't peterson them. · Be physically active. Get at least 30 minutes of exercise on most days of the week. Walking is a good choice. You also may want to do other activities, such as running, swimming, cycling, or playing tennis or team sports. · Stay at a healthy weight or lose weight by making the changes in eating and physical activity listed above. Losing just a small amount of weight, even 5 to 10 pounds, can reduce your risk for having a heart attack or stroke. · Do not smoke. When should you call for help? Watch closely for changes in your health, and be sure to contact your doctor if:    · You need help making lifestyle changes.     · You have questions about your medicine. Where can you learn more? Go to https://chnoraeb.Baeta. org and sign in to your [employer] account. Enter Q845 in the Intrusic box to learn more about \"High Cholesterol: Care Instructions. \"     If you do not have an account, please click on the \"Sign Up Now\" link. Current as of: July 22, 2018  Content Version: 12.0  © 0226-5080 [employer]. Care instructions adapted under license by [facility] ([facility]). If you have questions about a medical condition or this instruction, always ask your healthcare professional. [address] any warranty or liability for your use of this information. Patient Education        High Cholesterol: Care Instructions  Your Care Instructions    Cholesterol is a type of fat in your blood. It is needed for many body functions, such as making new cells. Cholesterol is made by your body. It also comes from food you eat. High cholesterol means that you have too much of the fat in your blood. This raises your risk of a heart attack and stroke. LDL and HDL are part of your total cholesterol. LDL is the \"bad\" cholesterol. High LDL can raise your risk for heart disease, heart attack, and stroke.  HDL is the \"good\"

## 2019-10-07 ENCOUNTER — TELEPHONE (OUTPATIENT)
Dept: FAMILY MEDICINE CLINIC | Age: 64
End: 2019-10-07

## 2020-01-06 ENCOUNTER — OFFICE VISIT (OUTPATIENT)
Dept: FAMILY MEDICINE CLINIC | Age: 65
End: 2020-01-06
Payer: COMMERCIAL

## 2020-01-06 VITALS
SYSTOLIC BLOOD PRESSURE: 134 MMHG | HEART RATE: 78 BPM | BODY MASS INDEX: 32.99 KG/M2 | HEIGHT: 65 IN | DIASTOLIC BLOOD PRESSURE: 82 MMHG | WEIGHT: 198 LBS

## 2020-01-06 PROCEDURE — 99213 OFFICE O/P EST LOW 20 MIN: CPT | Performed by: FAMILY MEDICINE

## 2020-01-06 RX ORDER — IRBESARTAN AND HYDROCHLOROTHIAZIDE 150; 12.5 MG/1; MG/1
1 TABLET, FILM COATED ORAL DAILY
Qty: 90 TABLET | Refills: 1 | Status: SHIPPED | OUTPATIENT
Start: 2020-01-06 | End: 2020-07-06 | Stop reason: SDUPTHER

## 2020-01-06 RX ORDER — ATORVASTATIN CALCIUM 40 MG/1
40 TABLET, FILM COATED ORAL DAILY
Qty: 90 TABLET | Refills: 1 | Status: SHIPPED | OUTPATIENT
Start: 2020-01-06 | End: 2020-07-06 | Stop reason: SDUPTHER

## 2020-01-06 SDOH — ECONOMIC STABILITY: TRANSPORTATION INSECURITY
IN THE PAST 12 MONTHS, HAS LACK OF TRANSPORTATION KEPT YOU FROM MEETINGS, WORK, OR FROM GETTING THINGS NEEDED FOR DAILY LIVING?: NO

## 2020-01-06 SDOH — ECONOMIC STABILITY: TRANSPORTATION INSECURITY
IN THE PAST 12 MONTHS, HAS THE LACK OF TRANSPORTATION KEPT YOU FROM MEDICAL APPOINTMENTS OR FROM GETTING MEDICATIONS?: NO

## 2020-01-06 SDOH — ECONOMIC STABILITY: FOOD INSECURITY: WITHIN THE PAST 12 MONTHS, YOU WORRIED THAT YOUR FOOD WOULD RUN OUT BEFORE YOU GOT MONEY TO BUY MORE.: NEVER TRUE

## 2020-01-06 SDOH — ECONOMIC STABILITY: INCOME INSECURITY: HOW HARD IS IT FOR YOU TO PAY FOR THE VERY BASICS LIKE FOOD, HOUSING, MEDICAL CARE, AND HEATING?: NOT HARD AT ALL

## 2020-01-06 SDOH — ECONOMIC STABILITY: FOOD INSECURITY: WITHIN THE PAST 12 MONTHS, THE FOOD YOU BOUGHT JUST DIDN'T LAST AND YOU DIDN'T HAVE MONEY TO GET MORE.: NEVER TRUE

## 2020-01-06 ASSESSMENT — PATIENT HEALTH QUESTIONNAIRE - PHQ9
SUM OF ALL RESPONSES TO PHQ9 QUESTIONS 1 & 2: 0
1. LITTLE INTEREST OR PLEASURE IN DOING THINGS: 0
SUM OF ALL RESPONSES TO PHQ QUESTIONS 1-9: 0
SUM OF ALL RESPONSES TO PHQ QUESTIONS 1-9: 0
2. FEELING DOWN, DEPRESSED OR HOPELESS: 0

## 2020-01-06 ASSESSMENT — ENCOUNTER SYMPTOMS
WHEEZING: 0
SHORTNESS OF BREATH: 0

## 2020-01-06 NOTE — PROGRESS NOTES
Wellbutrin [Bupropion] Anxiety     Health Maintenance   Topic Date Due    Hepatitis C screen  1955    DTaP/Tdap/Td vaccine (1 - Tdap) 05/29/1968    HIV screen  05/29/1972    Shingles Vaccine (1 of 2) 05/29/2007    Cervical cancer screen  05/07/2015    Breast cancer screen  10/02/2016    Flu vaccine (1) 09/01/2019    A1C test (Diabetic or Prediabetic)  03/14/2020    Lipid screen  03/14/2020    Potassium monitoring  03/14/2020    Creatinine monitoring  03/14/2020    Colon Cancer Screen FIT/FOBT  03/19/2020    Pneumococcal 0-64 years Vaccine  Aged Out       Objective:  /82 (Site: Left Upper Arm, Position: Sitting, Cuff Size: Medium Adult)   Pulse 78   Ht 5' 5\" (1.651 m)   Wt 198 lb (89.8 kg)   BMI 32.95 kg/m²   Physical Exam   Constitutional: She is oriented to person, place, and time. She appears well-developed and well-nourished. Cardiovascular: Normal rate, regular rhythm and normal heart sounds. No murmur heard. Pulmonary/Chest: Effort normal and breath sounds normal. No respiratory distress. She has no wheezes. Neurological: She is alert and oriented to person, place, and time. Psychiatric: She has a normal mood and affect. Her speech is normal and behavior is normal. Thought content normal.   Vitals reviewed. Lab Results   Component Value Date    WBC 6.7 03/14/2019    HGB 13.2 03/14/2019    HCT 39.0 03/14/2019     03/14/2019    CHOL 278 (H) 03/14/2019    TRIG 265 (H) 03/14/2019    HDL 45.9 03/14/2019    ALT 24 03/14/2019    AST 22 03/14/2019     03/14/2019    K 4.7 03/14/2019     03/14/2019    CREATININE 0.8 03/14/2019    BUN 12 03/14/2019    CO2 29 03/14/2019    TSH 1.96 03/16/2018    LABA1C 6.1 (H) 03/14/2019       Impression/Plan:  1. Essential hypertension  Well-controlled. Chronic condition. Refill medication(s). Check labs  - irbesartan-hydrochlorothiazide (AVALIDE) 150-12.5 MG per tablet;  Take 1 tablet by mouth daily  Dispense: 90 tablet; Refill: 1  - CBC; Future  - Comprehensive Metabolic Panel; Future    2. Pure hypercholesterolemia  Chronic. Refill med. Check status of control.   - atorvastatin (LIPITOR) 40 MG tablet; Take 1 tablet by mouth daily  Dispense: 90 tablet; Refill: 1  - Comprehensive Metabolic Panel; Future  - Lipid Panel; Future    3. Pre-diabetes  R/o DM with a1c  - Hemoglobin A1C; Future    4. Elevated fasting glucose  - Hemoglobin A1C; Future    Declines flu vaccine, mammogram and pap. They voiced understanding. All questions answered. They agreed with treatment plan. See patient instructions for any educational materials that may have been given. Discussed use, benefit, and side effects of prescribed medications. Reviewed health maintenance. (Please note that portions of this note may have been completed with a voice recognition program.  Efforts were made to edit the dictation but occasionally words are mis-transcribed.)    Return in about 6 months (around 7/6/2020) for HTN; hyperlipidemia. .    Electronically signed by Ольга Jaime MD on 1/6/2020 at 3:22 PM

## 2020-01-23 ENCOUNTER — TELEPHONE (OUTPATIENT)
Dept: FAMILY MEDICINE CLINIC | Age: 65
End: 2020-01-23

## 2020-01-27 ENCOUNTER — OFFICE VISIT (OUTPATIENT)
Dept: FAMILY MEDICINE CLINIC | Age: 65
End: 2020-01-27
Payer: COMMERCIAL

## 2020-01-27 VITALS
WEIGHT: 198 LBS | SYSTOLIC BLOOD PRESSURE: 156 MMHG | HEIGHT: 65 IN | HEART RATE: 84 BPM | BODY MASS INDEX: 32.99 KG/M2 | OXYGEN SATURATION: 99 % | DIASTOLIC BLOOD PRESSURE: 90 MMHG

## 2020-01-27 PROCEDURE — 99214 OFFICE O/P EST MOD 30 MIN: CPT | Performed by: FAMILY MEDICINE

## 2020-01-27 RX ORDER — BIOTIN 10 MG
10 TABLET ORAL DAILY
COMMUNITY
End: 2022-07-14

## 2020-01-27 RX ORDER — CIPROFLOXACIN 750 MG/1
750 TABLET, FILM COATED ORAL 2 TIMES DAILY
COMMUNITY
End: 2020-02-10 | Stop reason: ALTCHOICE

## 2020-01-27 ASSESSMENT — ENCOUNTER SYMPTOMS
WHEEZING: 0
SHORTNESS OF BREATH: 0

## 2020-01-27 NOTE — PROGRESS NOTES
[facility] PROFESSIONAL [facility]  [address] Located within [facility]  Dept: [phone]  Dept Fax: [phone]  Loc: [phone]  PROGRESS NOTE      Visit Date: 1/27/2020    Michael Pearce is a 60 y.o. female who presents today for:  Chief Complaint   Patient presents with    Pre-op Exam     surgery with Dr Priscilla Coleman 02/21/2020  left total knee       Subjective:  HPI     Preop for surgery left knee total arthroplasty by Dr. Priscilla Coleman to be done on February 21st.    No previous history of CAD. No history of stress test or cardiac cath. she does have hypercholesterolemia and prediabetes. She is being treated for UTI with Cipro. Physical activity level:  Vacuuming, cleaning house and walking up and down a flight of stairs without chest pain or SOB. Rides exercise bike 10 minutes per day without symptoms. No hx of problems with anesthesia. Review of Systems   Constitutional: Negative for chills and fever. Respiratory: Negative for shortness of breath and wheezing. Cardiovascular: Negative for chest pain and leg swelling. Genitourinary: Negative for dysuria and frequency. Musculoskeletal: Positive for arthralgias. Hematological: Negative for adenopathy. Does not bruise/bleed easily. Patient Active Problem List   Diagnosis    Hypertension    Hyperlipidemia    Atrophic vaginitis    Elevated fasting glucose    Pre-diabetes     Past Medical History:   Diagnosis Date    Depression     Hyperlipidemia     Hypertension       Past Surgical History:   Procedure Laterality Date    CHOLECYSTECTOMY      KNEE ARTHROSCOPY Left 08/2018     No family history on file.   Social History     Tobacco Use    Smoking status: Never Smoker    Smokeless tobacco: Never Used   Substance Use Topics    Alcohol use: No      Current Outpatient Medications   Medication Sig Dispense Refill    Magnesium Oxide (MAG-200 PO) Take by mouth daily      Rate and Rhythm: Normal rate and regular rhythm. Heart sounds: Normal heart sounds. No murmur. Pulmonary:      Effort: Pulmonary effort is normal. No respiratory distress. Breath sounds: Normal breath sounds. No wheezing. Abdominal:      Palpations: Abdomen is soft. Tenderness: There is no abdominal tenderness. Musculoskeletal:      Right lower leg: No edema. Left lower leg: No edema. Neurological:      Mental Status: She is alert and oriented to person, place, and time. Mental status is at baseline. Psychiatric:         Mood and Affect: Mood normal.         Speech: Speech normal.         Behavior: Behavior normal.         Thought Content: Thought content normal.      good neck ROM. Data:  EKG: Normal sinus with some nonspecific ST-T wave changes similar to previous. Chest x-ray: No acute cardiopulmonary findings  Reviewed BMP, CBC and a urinalysis. The above data was all reviewed including EKG. Impression/Plan:  1. Primary osteoarthritis of left knee  Chronic right knee pain secondary to osteoarthritis. Preop evaluation for total knee arthroplasty    2. Acute cystitis with hematuria  Currently being treated with Cipro. 3. Pre-diabetes  Check A1c and lipids. Lab orders given to patient. 4. Hypertension:  Continue Avalide. Chronic. Not well controlled. Blood pressure is elevated today. We will recheck at nurse visit in 1 week. Consider medication adjustment if needed. 5.  Pre-op evaluation  Preop evaluation today. She can perform greater than 4 METS of physical activity without symptoms. No previous history of CAD. She does have hyperlipidemia, prediabetes, and hypertension. She had mildly elevated blood pressure today for which she will return the office for nurse visit recheck.   she is acceptable risk to proceed with surgery      Pre-Operative Risk assessment using 2014 ACC/AHA guidelines     Emergent procedure No  Active Cardiac Condition No (decompensated

## 2020-02-01 LAB
AVERAGE GLUCOSE: 140
CHOLESTEROL, TOTAL: 236 MG/DL
CHOLESTEROL/HDL RATIO: 5.5
HBA1C MFR BLD: 6.5 %
HDLC SERPL-MCNC: 43 MG/DL (ref 35–70)
LDL CHOLESTEROL CALCULATED: 140 MG/DL (ref 0–160)
TRIGL SERPL-MCNC: 267 MG/DL
VLDLC SERPL CALC-MCNC: 53 MG/DL

## 2020-02-03 ENCOUNTER — TELEPHONE (OUTPATIENT)
Dept: FAMILY MEDICINE CLINIC | Age: 65
End: 2020-02-03

## 2020-02-03 PROBLEM — E11.9 DIABETES MELLITUS, NEW ONSET (HCC): Status: ACTIVE | Noted: 2020-02-03

## 2020-02-03 NOTE — TELEPHONE ENCOUNTER
Path labs results:  Lipids show  which is better than previous. a1c is 6.5% which means new diagnosis of DM. Recommend f/u appt in office in next 2 weeks to discuss DM. No CBC are CMP results so I am not sure if these were done. Please advise patient.   Sarah Gerber MD

## 2020-02-05 ENCOUNTER — NURSE ONLY (OUTPATIENT)
Dept: FAMILY MEDICINE CLINIC | Age: 65
End: 2020-02-05

## 2020-02-05 VITALS — SYSTOLIC BLOOD PRESSURE: 138 MMHG | HEART RATE: 80 BPM | DIASTOLIC BLOOD PRESSURE: 82 MMHG

## 2020-02-10 ENCOUNTER — OFFICE VISIT (OUTPATIENT)
Dept: FAMILY MEDICINE CLINIC | Age: 65
End: 2020-02-10
Payer: COMMERCIAL

## 2020-02-10 VITALS
OXYGEN SATURATION: 98 % | BODY MASS INDEX: 31.99 KG/M2 | HEART RATE: 78 BPM | DIASTOLIC BLOOD PRESSURE: 90 MMHG | SYSTOLIC BLOOD PRESSURE: 156 MMHG | HEIGHT: 65 IN | WEIGHT: 192 LBS

## 2020-02-10 PROCEDURE — 99213 OFFICE O/P EST LOW 20 MIN: CPT | Performed by: FAMILY MEDICINE

## 2020-02-10 NOTE — PATIENT INSTRUCTIONS
Patient Education        Learning About Type 2 Diabetes  What is type 2 diabetes? Insulin is a hormone that helps your body use sugar from your food as energy. Type 2 diabetes happens when your body can't use insulin the right way. Over time, the pancreas can't make enough insulin. If you don't have enough insulin, too much sugar stays in your blood. If you are overweight, get little or no exercise, or have type 2 diabetes in your family, you are more likely to have problems with the way insulin works in your body.  Americans, Hispanics, Native Americans,  Americans, and Pacific Islanders have a higher risk for type 2 diabetes. Type 2 diabetes can be prevented or delayed with a healthy lifestyle, which includes staying at a healthy weight, making smart food choices, and getting regular exercise. What can you expect with type 2 diabetes? Shyann Franz keep hearing about how important it is to keep your blood sugar within a target range. That's because over time, high blood sugar can lead to serious problems. It can:  · Harm your eyes, nerves, and kidneys. · Damage your blood vessels, leading to heart disease and stroke. · Reduce blood flow and cause nerve damage to parts of your body, especially your feet. This can cause slow healing and pain when you walk. · Make your immune system weak and less able to fight infections. When people hear the word \"diabetes,\" they often think of problems like these. But daily care and treatment can help prevent or delay these problems. The goal is to keep your blood sugar in a target range. That's the best way to reduce your chance of having more problems from diabetes. What are the symptoms? Some people who have type 2 diabetes may not have any symptoms early on. Many people with the disease don't even know they have it at first. But with time, diabetes starts to cause symptoms.  You experience most symptoms of type 2 diabetes when your blood sugar is either too high or too low. The most common symptoms of high blood sugar include:  · Thirst.  · Frequent urination. · Weight loss. · Blurry vision. The symptoms of low blood sugar include:  · Sweating. · Shakiness. · Weakness. · Hunger. · Confusion. How can you prevent type 2 diabetes? The best way to prevent or delay type 2 diabetes is to adopt healthy habits, which include:  · Staying at a healthy weight. · Exercising regularly. · Eating healthy foods. How is type 2 diabetes treated? If you have type 2 diabetes, here are the most important things you can do. · Take your diabetes medicines. · Check your blood sugar as often as your doctor recommends. Also, get a hemoglobin A1c test at least every 6 months. · Try to eat a variety of foods and to spread carbohydrate throughout the day. Carbohydrate raises blood sugar higher and more quickly than any other nutrient does. Carbohydrate is found in sugar, breads and cereals, fruit, starchy vegetables such as potatoes and corn, and milk and yogurt. · Get at least 30 minutes of exercise on most days of the week. Walking is a good choice. You also may want to do other activities, such as running, swimming, cycling, or playing tennis or team sports. If your doctor says it's okay, do muscle-strengthening exercises at least 2 times a week. · See your doctor for checkups and tests on a regular schedule. · If you have high blood pressure or high cholesterol, take the medicines as prescribed by your doctor. · Do not smoke. Smoking can make health problems worse. This includes problems you might have with type 2 diabetes. If you need help quitting, talk to your doctor about stop-smoking programs and medicines. These can increase your chances of quitting for good. Follow-up care is a key part of your treatment and safety. Be sure to make and go to all appointments, and call your doctor if you are having problems.  It's also a good idea to know your test results and keep a list

## 2020-02-10 NOTE — PROGRESS NOTES
[facility] PROFESSIONAL [facility]  [address] [place] [address]  Dept: [phone]  Dept Fax: [phone]  Loc: [phone]  PROGRESS NOTE      Visit Date: 2/10/2020    Graciela Moreno is a 60 y.o. female who presents today for:  Chief Complaint   Patient presents with    Diabetes       Subjective:  Diabetes   She presents for her initial diabetic visit. She has type 2 diabetes mellitus. Her disease course has been worsening. There are no hypoglycemic associated symptoms. Pertinent negatives for diabetes include no chest pain, no fatigue, no polydipsia and no polyuria. Pertinent negatives for diabetic complications include no CVA or heart disease. Risk factors for coronary artery disease include diabetes mellitus, dyslipidemia, obesity, hypertension and post-menopausal. When asked about current treatments, none were reported. She participates in exercise daily. An ACE inhibitor/angiotensin II receptor blocker is being taken. New dx DM:  a1c 6.5% recently. Doing exercise bike. Scheduled for knee replacement. Wants lungs listened to. Has a cough that is improving. Review of Systems   Constitutional: Negative for fatigue. Cardiovascular: Negative for chest pain. Endocrine: Negative for polydipsia and polyuria. Patient Active Problem List   Diagnosis    Hypertension    Hyperlipidemia    Atrophic vaginitis    Diabetes mellitus, new onset ([facility] [address]     Past Medical History:   Diagnosis Date    Depression     Hyperlipidemia     Hypertension       Past Surgical History:   Procedure Laterality Date    CHOLECYSTECTOMY      KNEE ARTHROSCOPY Left 08/2018     No family history on file.   Social History     Tobacco Use    Smoking status: Never Smoker    Smokeless tobacco: Never Used   Substance Use Topics    Alcohol use: No      Current Outpatient Medications   Medication Sig Dispense Refill    Magnesium Oxide (MAG-200 PO) Take by mouth daily      Biotin 10 MG tablet Take 10 mg by mouth daily      irbesartan-hydrochlorothiazide (AVALIDE) 150-12.5 MG per tablet Take 1 tablet by mouth daily 90 tablet 1    atorvastatin (LIPITOR) 40 MG tablet Take 1 tablet by mouth daily 90 tablet 1     No current facility-administered medications for this visit. Allergies   Allergen Reactions    Ace Inhibitors Other (See Comments)     cough    Diovan Hct [Valsartan-Hydrochlorothiazide]      Hands numbed up    Mevacor [Lovastatin] Other (See Comments)     myalgia    Wellbutrin [Bupropion] Anxiety     Health Maintenance   Topic Date Due    Hepatitis C screen  1955    Pneumococcal 0-64 years Vaccine (1 of 1 - PPSV23) 05/29/1963    Diabetic foot exam  05/29/1967    Diabetic retinal exam  05/29/1967    DTaP/Tdap/Td vaccine (1 - Tdap) 05/29/1968    HIV screen  05/29/1972    Diabetic microalbuminuria test  05/29/1975    Hepatitis B vaccine (1 of 3 - Risk 3-dose series) 05/29/1976    Shingles Vaccine (1 of 2) 05/29/2007    Cervical cancer screen  05/07/2015    Breast cancer screen  10/02/2016    Flu vaccine (1) 09/01/2019    A1C test (Diabetic or Prediabetic)  03/14/2020    Lipid screen  03/14/2020    Potassium monitoring  03/14/2020    Creatinine monitoring  03/14/2020    Colon Cancer Screen FIT/FOBT  03/19/2020    Hepatitis A vaccine  Aged Out    Hib vaccine  Aged Out    Meningococcal (ACWY) vaccine  Aged Out       Objective:  BP (!) 156/90   Pulse 78   Ht 5' 5\" (1.651 m)   Wt 192 lb (87.1 kg)   SpO2 98%   BMI 31.95 kg/m²   Physical Exam  Vitals signs reviewed. Constitutional:       General: She is not in acute distress. Appearance: She is well-developed. Cardiovascular:      Rate and Rhythm: Normal rate and regular rhythm. Heart sounds: No murmur. Pulmonary:      Effort: Pulmonary effort is normal. No respiratory distress. Breath sounds: Normal breath sounds. No wheezing.    Neurological:      Mental Status: She is alert and oriented to person, place, and time. Mental status is at baseline. Psychiatric:         Mood and Affect: Mood normal.         Behavior: Behavior normal.     Visual inspection:  Deformity/amputation: absent  Skin lesions/pre-ulcerative calluses: absent  Edema: right- negative, left- negative    Sensory exam:  Monofilament sensation: normal  (minimum of 5 random plantar locations tested, avoiding callused areas - > 1 area with absence of sensation is + for neuropathy)    Plus at least one of the following:  Pulses: normal,     Impression/Plan:  1. Diabetes mellitus, new onset ([facility] [address]  New diagnosis of diabetes with A1c of 6.5%. Discussed in detail. Discussed the diagnosis and management. Discussed that checking glucose levels daily does not improve outcomes. Will hold on daily monitoring.  -Start metformin once a day  She is already on an ARB and statin. LDL was 140 recently so will wait to change statin until after she recovers from TKA. She will start aspirin 81 mg after she completes her anticoagulant post total knee arthroplasty.  -She would like to hold on referral to dietitian given her upcoming knee replacement surgery  -Discussed diet and exercise. She continues to do stationary bike. -  DIABETES FOOT EXAM  - metFORMIN (GLUCOPHAGE) 500 MG tablet; Take 1 tablet by mouth daily (with breakfast)  Dispense: 90 tablet; Refill: 1    Nurse visit 1 week for BP    They voiced understanding. All questions answered. They agreed with treatment plan. See patient instructions for any educational materials that may have been given. Discussed use, benefit, and side effects of prescribed medications. Reviewed health maintenance.     (Please note that portions of this note may have been completed with a voice recognition program.  Efforts were made to edit the dictation but occasionally words are mis-transcribed.)    Return in about 3 months (around 5/10/2020) for DM (poc

## 2020-02-12 ENCOUNTER — NURSE ONLY (OUTPATIENT)
Dept: FAMILY MEDICINE CLINIC | Age: 65
End: 2020-02-12

## 2020-02-12 VITALS — DIASTOLIC BLOOD PRESSURE: 82 MMHG | SYSTOLIC BLOOD PRESSURE: 152 MMHG | HEART RATE: 76 BPM

## 2020-02-19 ENCOUNTER — NURSE ONLY (OUTPATIENT)
Dept: FAMILY MEDICINE CLINIC | Age: 65
End: 2020-02-19

## 2020-02-19 VITALS — HEART RATE: 92 BPM | DIASTOLIC BLOOD PRESSURE: 72 MMHG | SYSTOLIC BLOOD PRESSURE: 132 MMHG

## 2020-02-19 PROCEDURE — 2000F BLOOD PRESSURE MEASURE: CPT | Performed by: FAMILY MEDICINE

## 2020-03-09 ENCOUNTER — OFFICE VISIT (OUTPATIENT)
Dept: FAMILY MEDICINE CLINIC | Age: 65
End: 2020-03-09
Payer: COMMERCIAL

## 2020-03-09 VITALS
WEIGHT: 174 LBS | DIASTOLIC BLOOD PRESSURE: 80 MMHG | BODY MASS INDEX: 28.99 KG/M2 | SYSTOLIC BLOOD PRESSURE: 124 MMHG | OXYGEN SATURATION: 99 % | HEIGHT: 65 IN | HEART RATE: 90 BPM

## 2020-03-09 PROCEDURE — 99213 OFFICE O/P EST LOW 20 MIN: CPT | Performed by: FAMILY MEDICINE

## 2020-03-09 RX ORDER — SYRING-NEEDL,DISP,INSUL,0.3 ML 30 GX5/16"
1 SYRINGE, EMPTY DISPOSABLE MISCELLANEOUS CONTINUOUS
Qty: 1 EACH | Refills: 11 | Status: SHIPPED | OUTPATIENT
Start: 2020-03-09 | End: 2021-12-17 | Stop reason: SDUPTHER

## 2020-03-09 ASSESSMENT — ENCOUNTER SYMPTOMS
WHEEZING: 0
SHORTNESS OF BREATH: 0

## 2020-03-09 NOTE — PROGRESS NOTES
tablet 1    metFORMIN (GLUCOPHAGE) 500 MG tablet Take 1 tablet by mouth daily (with breakfast) (Patient not taking: Reported on 3/9/2020) 90 tablet 1     No current facility-administered medications for this visit. Allergies   Allergen Reactions    Ace Inhibitors Other (See Comments)     cough    Diovan Hct [Valsartan-Hydrochlorothiazide]      Hands numbed up    Mevacor [Lovastatin] Other (See Comments)     myalgia    Wellbutrin [Bupropion] Anxiety     Health Maintenance   Topic Date Due    Hepatitis C screen  1955    Pneumococcal 0-64 years Vaccine (1 of 1 - PPSV23) 05/29/1963    Diabetic retinal exam  05/29/1967    HIV screen  05/29/1972    Diabetic microalbuminuria test  05/29/1975    Hepatitis B vaccine (1 of 3 - Risk 3-dose series) 05/29/1976    DTaP/Tdap/Td vaccine (1 - Tdap) 05/29/1976    Shingles Vaccine (1 of 2) 05/29/2007    Cervical cancer screen  05/07/2015    Breast cancer screen  10/02/2016    Flu vaccine (1) 09/01/2019    Potassium monitoring  03/14/2020    Creatinine monitoring  03/14/2020    Colon Cancer Screen FIT/FOBT  03/19/2020    A1C test (Diabetic or Prediabetic)  02/01/2021    Lipid screen  02/01/2021    Diabetic foot exam  02/10/2021    Hepatitis A vaccine  Aged Out    Hib vaccine  Aged Out    Meningococcal (ACWY) vaccine  Aged Out       Objective:  /80 (Site: Left Upper Arm, Position: Sitting, Cuff Size: Large Adult)   Pulse 90   Ht 5' 5\" (1.651 m)   Wt 174 lb (78.9 kg)   SpO2 99%   BMI 28.96 kg/m²   Physical Exam  Vitals signs reviewed. Constitutional:       General: She is not in acute distress. Appearance: She is well-developed. Cardiovascular:      Rate and Rhythm: Normal rate and regular rhythm. Heart sounds: No murmur. Pulmonary:      Effort: Pulmonary effort is normal. No respiratory distress. Breath sounds: Normal breath sounds. No wheezing or rhonchi.    Neurological:      Mental Status: She is alert and oriented to person, place, and time. Mental status is at baseline. Psychiatric:         Mood and Affect: Mood normal.         Behavior: Behavior normal.       Left knee: Anterior incision is clean, dry, and intact. No erythema. left calf is soft and nontender. Negative Thompson sign. Stockings are in place. Impression/Plan:  1. Primary osteoarthritis of left knee  Arthritis of left knee status post total knee arthroplasty about 2.5 weeks ago. Doing well. Continue with physical therapy. Continue to follow with orthopedics. 2. S/P total knee arthroplasty, left      3. Type 2 diabetes mellitus without complication, without long-term current use of insulin (HCC)  Chronic. Previously well controlled. Not taking metformin due to weight loss and decreased appetite as well as diarrhea. Stop metformin. We will have her check her glucose levels once a day fasting in the morning. If her glucose levels are consistently above 150, she is to call our office and we will consider starting glyburide.  - blood glucose test strips (ASCENSIA AUTODISC VI;ONE TOUCH ULTRA TEST VI) strip; Use with associated glucose meter. Dispense: 100 strip; Refill: 11  - Lancet Device MISC; 1 each by Does not apply route continuous May use any brand  Dispense: 1 each; Refill: 11  - Blood Glucose Monitoring Suppl MISC; Dispense one monitor for associated glucometer. Dispense: 1 each; Refill: 0    4. Decreased appetite  Improving. 5. Colon cancer screening  - POCT Fecal Immunochemical Test (FIT); Future      They voiced understanding. All questions answered. They agreed with treatment plan. See patient instructions for any educational materials that may have been given. Discussed use, benefit, and side effects of prescribed medications. Reviewed health maintenance.     (Please note that portions of this note may have been completed with a voice recognition program.  Efforts were made to edit the dictation but occasionally words are mis-transcribed.)    Return if symptoms worsen or fail to improve.       Electronically signed by Sarah Gerber MD on 3/9/2020 at 1:53 PM

## 2020-07-06 ENCOUNTER — VIRTUAL VISIT (OUTPATIENT)
Dept: FAMILY MEDICINE CLINIC | Age: 65
End: 2020-07-06
Payer: COMMERCIAL

## 2020-07-06 PROCEDURE — 99214 OFFICE O/P EST MOD 30 MIN: CPT | Performed by: FAMILY MEDICINE

## 2020-07-06 RX ORDER — ATORVASTATIN CALCIUM 40 MG/1
40 TABLET, FILM COATED ORAL DAILY
Qty: 90 TABLET | Refills: 1 | Status: SHIPPED | OUTPATIENT
Start: 2020-07-06 | End: 2021-01-13 | Stop reason: SDUPTHER

## 2020-07-06 RX ORDER — IRBESARTAN AND HYDROCHLOROTHIAZIDE 150; 12.5 MG/1; MG/1
1 TABLET, FILM COATED ORAL DAILY
Qty: 90 TABLET | Refills: 1 | Status: SHIPPED | OUTPATIENT
Start: 2020-07-06 | End: 2021-01-13 | Stop reason: SDUPTHER

## 2020-07-06 ASSESSMENT — ENCOUNTER SYMPTOMS
SHORTNESS OF BREATH: 0
WHEEZING: 0

## 2020-07-06 NOTE — PROGRESS NOTES
2020    TELEHEALTH EVALUATION -- Audio/Visual (During [employer]-24 public health emergency)    HPI:    Marisol Slater (:  1955) has requested an audio/video evaluation for the following concern(s):    6-month follow-up:    Hypertension: On Avalide. No hx of CAD. She has not checked BP at home. Hyperlipidemia: On Lipitor. Lipids were checked in February and LDL [id number]. Type 2 diabetes: Previously diet and exercise controlled. A1c of 6.5% in 2020. She tried metformin and she had decreased appetite. She checks glucose once weekly. Glucose is 118-137 fasting. Right knee OA:  Had steroid injection in feb when she had left TKA. Pain was improved for over 3 months. Review of Systems   Respiratory: Negative for shortness of breath and wheezing. Cardiovascular: Negative for chest pain and leg swelling. Musculoskeletal: Positive for arthralgias. Negative for joint swelling. Neurological: Negative for dizziness and syncope. Prior to Visit Medications    Medication Sig Taking? Authorizing Provider   Biotin 10 MG tablet Take 10 mg by mouth daily Yes Historical Provider, MD   irbesartan-hydrochlorothiazide (AVALIDE) 150-12.5 MG per tablet Take 1 tablet by mouth daily Yes Christiano Baltazar MD   atorvastatin (LIPITOR) 40 MG tablet Take 1 tablet by mouth daily Yes Christiano Baltazar MD   blood glucose test strips (ASCENSIA AUTODISC VI;ONE TOUCH ULTRA TEST VI) strip Use with associated glucose meter. Christiano Baltazar MD   Lancet Device MISC 1 each by Does not apply route continuous May use any brand  Christiano Baltazar MD   Blood Glucose Monitoring Suppl MISC Dispense one monitor for associated glucometer.   Christiano Baltazar MD       Social History     Tobacco Use    Smoking status: Never Smoker    Smokeless tobacco: Never Used   Substance Use Topics    Alcohol use: No    Drug use: No        Allergies   Allergen Reactions    Ace Inhibitors Other (See Comments)     cough    Diovan Hct [Valsartan-Hydrochlorothiazide]      Hands numbed up    Mevacor [Lovastatin] Other (See Comments)     myalgia    Wellbutrin [Bupropion] Anxiety   ,   Past Medical History:   Diagnosis Date    Depression     Hyperlipidemia     Hypertension        PHYSICAL EXAMINATION:  [ INSTRUCTIONS:  \"[x]\" Indicates a positive item  \"[]\" Indicates a negative item  -- DELETE ALL ITEMS NOT EXAMINED]    Constitutional: [x] Appears well-developed and well-nourished [x] No apparent distress      [] Abnormal-   Mental status  [x] Alert and awake  [] Oriented to person/place/time [x]Able to follow commands      Eyes:  Sclera  [x]  Normal  [] Abnormal -         Discharge [x]  None visible  [] Abnormal -    HENT:   [x] Normocephalic, atraumatic. [] Abnormal   [x] Mouth/Throat: Mucous membranes are moist.     Neck: [x] No visualized mass     Pulmonary/Chest: [x] Respiratory effort normal.  [x] No visualized signs of difficulty breathing or respiratory distress        [] Abnormal-           Skin:        [x] No significant exanthematous lesions or discoloration noted on facial skin         [] Abnormal-            Psychiatric:       [x] Normal Affect [x] No Hallucinations        [] Abnormal-     Other pertinent observable physical exam findings-     ASSESSMENT/PLAN:  1. Essential hypertension  Well-controlled. Chronic condition. Refill medication(s). - irbesartan-hydrochlorothiazide (AVALIDE) 150-12.5 MG per tablet; Take 1 tablet by mouth daily  Dispense: 90 tablet; Refill: 1    2. Pure hypercholesterolemia  Chronic. Was elevated/uncontrolled at last check. Continue Lipitor  - atorvastatin (LIPITOR) 40 MG tablet; Take 1 tablet by mouth daily  Dispense: 90 tablet; Refill: 1    3. Type 2 diabetes mellitus without complication, without long-term current use of insulin (HCC)  Chronic. Previously controlled at 6.5% through diet and exercise. Fasting glucose levels are good. Will obtain POC A1c at next appointment    4. Chronic pain of right knee  Chronic right knee pain secondary osteoarthritis. Worsening. She will schedule procedure visit for injection    5. Arthritis of right knee  As above      Return in about 6 months (around 1/6/2021) for DM, HTN;  appt for  right knee injection ASAPDarrick Doherty is a 63 y.o. female being evaluated by a Virtual Visit (video visit) encounter to address concerns as mentioned above. A caregiver was present when appropriate. Due to this being a TeleHealth encounter (During [facility] public health emergency), evaluation of the following organ systems was limited: Vitals/Constitutional/EENT/Resp/CV/GI//MS/Neuro/Skin/Heme-Lymph-Imm. Pursuant to the emergency declaration under the [address] [place] [address], [address] [place] [address] authority and the [employer] and [employer] Act, this Virtual Visit was conducted with patient's (and/or legal guardian's) consent, to reduce the patient's risk of exposure to COVID-19 and provide necessary medical care. The patient (and/or legal guardian) has also been advised to contact this office for worsening conditions or problems, and seek emergency medical treatment and/or call 911 if deemed necessary. Patient identification was verified at the start of the visit: Yes    Total time spent on this encounter: Not billed by time    Services were provided through a video synchronous discussion virtually to substitute for in-person clinic visit. Patient and provider were located at their individual homes. --Blaine Dominguez MD on 7/6/2020 at 8:22 AM    An electronic signature was used to authenticate this note.

## 2020-07-10 ENCOUNTER — OFFICE VISIT (OUTPATIENT)
Dept: FAMILY MEDICINE CLINIC | Age: 65
End: 2020-07-10
Payer: COMMERCIAL

## 2020-07-10 VITALS
HEIGHT: 65 IN | WEIGHT: 192 LBS | BODY MASS INDEX: 31.99 KG/M2 | HEART RATE: 80 BPM | SYSTOLIC BLOOD PRESSURE: 126 MMHG | DIASTOLIC BLOOD PRESSURE: 84 MMHG | TEMPERATURE: 98.2 F

## 2020-07-10 PROCEDURE — 20610 DRAIN/INJ JOINT/BURSA W/O US: CPT | Performed by: FAMILY MEDICINE

## 2020-07-10 RX ORDER — TRIAMCINOLONE ACETONIDE 40 MG/ML
80 INJECTION, SUSPENSION INTRA-ARTICULAR; INTRAMUSCULAR ONCE
Status: COMPLETED | OUTPATIENT
Start: 2020-07-10 | End: 2020-07-10

## 2020-07-10 RX ORDER — BUPIVACAINE HYDROCHLORIDE 5 MG/ML
4 INJECTION, SOLUTION PERINEURAL ONCE
Status: COMPLETED | OUTPATIENT
Start: 2020-07-10 | End: 2020-07-10

## 2020-07-10 RX ORDER — LIDOCAINE HYDROCHLORIDE 10 MG/ML
4 INJECTION, SOLUTION INFILTRATION; PERINEURAL ONCE
Status: COMPLETED | OUTPATIENT
Start: 2020-07-10 | End: 2020-07-10

## 2020-07-10 RX ADMIN — BUPIVACAINE HYDROCHLORIDE 20 MG: 5 INJECTION, SOLUTION PERINEURAL at 10:10

## 2020-07-10 RX ADMIN — TRIAMCINOLONE ACETONIDE 80 MG: 40 INJECTION, SUSPENSION INTRA-ARTICULAR; INTRAMUSCULAR at 10:11

## 2020-07-10 RX ADMIN — LIDOCAINE HYDROCHLORIDE 4 ML: 10 INJECTION, SOLUTION INFILTRATION; PERINEURAL at 10:11

## 2020-07-10 NOTE — PROGRESS NOTES
[address]  [address]  [place] [address]  [phone]      Procedure Note Right Knee Injection    Diagnosis: Right knee OA    Discussed at prior VV. Discussed risks and benefits of steroid injection, including but not limited to infection, skin discoloration, pain, and bleeding. With the patient's verbal informed consent, her right knee was prepped  in standard sterile fashion with Betadine and Alcohol. Ethyl chloride was used to anesthetize the skin. A mixture of 4 cc of 0.5% Bupivacaine, 4 cc of 1% Lidocaine,  and 2 cc of Kenalog 40 mg was injected into the right anterior lateral joint space. The patient tolerated this well without difficulty. A band-aid was applied and the patient was advised to ice the knee for 15-20 minutes to relieve any injection site related pain. Follow Up: As Directed.       Shoshana Kevin MD

## 2021-01-13 ENCOUNTER — OFFICE VISIT (OUTPATIENT)
Dept: FAMILY MEDICINE CLINIC | Age: 66
End: 2021-01-13
Payer: COMMERCIAL

## 2021-01-13 VITALS
WEIGHT: 203.6 LBS | OXYGEN SATURATION: 98 % | SYSTOLIC BLOOD PRESSURE: 130 MMHG | TEMPERATURE: 98 F | HEART RATE: 84 BPM | HEIGHT: 65 IN | DIASTOLIC BLOOD PRESSURE: 82 MMHG | BODY MASS INDEX: 33.92 KG/M2

## 2021-01-13 DIAGNOSIS — I10 ESSENTIAL HYPERTENSION: ICD-10-CM

## 2021-01-13 DIAGNOSIS — E11.9 TYPE 2 DIABETES MELLITUS WITHOUT COMPLICATION, WITHOUT LONG-TERM CURRENT USE OF INSULIN (HCC): Primary | ICD-10-CM

## 2021-01-13 DIAGNOSIS — Z12.11 COLON CANCER SCREENING: ICD-10-CM

## 2021-01-13 DIAGNOSIS — E78.00 PURE HYPERCHOLESTEROLEMIA: ICD-10-CM

## 2021-01-13 PROCEDURE — 99214 OFFICE O/P EST MOD 30 MIN: CPT | Performed by: FAMILY MEDICINE

## 2021-01-13 RX ORDER — IRBESARTAN AND HYDROCHLOROTHIAZIDE 150; 12.5 MG/1; MG/1
1 TABLET, FILM COATED ORAL DAILY
Qty: 90 TABLET | Refills: 1 | Status: SHIPPED | OUTPATIENT
Start: 2021-01-13 | End: 2021-07-14 | Stop reason: SDUPTHER

## 2021-01-13 RX ORDER — ATORVASTATIN CALCIUM 40 MG/1
40 TABLET, FILM COATED ORAL DAILY
Qty: 90 TABLET | Refills: 1 | Status: SHIPPED | OUTPATIENT
Start: 2021-01-13 | End: 2021-07-14 | Stop reason: SDUPTHER

## 2021-01-13 ASSESSMENT — ENCOUNTER SYMPTOMS
SHORTNESS OF BREATH: 0
WHEEZING: 0

## 2021-01-13 ASSESSMENT — PATIENT HEALTH QUESTIONNAIRE - PHQ9
1. LITTLE INTEREST OR PLEASURE IN DOING THINGS: 0
SUM OF ALL RESPONSES TO PHQ QUESTIONS 1-9: 0
SUM OF ALL RESPONSES TO PHQ QUESTIONS 1-9: 0

## 2021-01-13 NOTE — PROGRESS NOTES
SRPX  [facility]  [address] [facility]  Dept: [phone]  Dept Fax: [phone]  Loc: [phone]  PROGRESS NOTE      Visit Date: 1/13/2021    Remigio Golden is a 63 y.o. female who presents today for:  Chief Complaint   Patient presents with    6 Month Follow-Up    Hypertension    Diabetes       Subjective:  HPI    6-month follow-up:    Hypertension: On Avalide. No hx of CAD. She has not checked BP at home. Type 2 diabetes: Previously diet and exercise controlled. A1c of 6.5% in February 2020. She tried metformin and she had decreased appetite. She checks glucose once weekly. Glucose is about 110-130 fasting. Hyperlipidemia: On Lipitor. Lipids were checked in February and LDL [id number]. Spot on right cheek. no darkness. (mole). Healing. Used a sugar scrub. Review of Systems   Constitutional: Negative for chills and fever. Respiratory: Negative for shortness of breath and wheezing. Cardiovascular: Negative for chest pain and leg swelling. Neurological: Negative for dizziness and syncope. Patient Active Problem List   Diagnosis    Hypertension    Hyperlipidemia    Atrophic vaginitis    Diabetes mellitus, new onset ([address]     Past Medical History:   Diagnosis Date    Depression     Hyperlipidemia     Hypertension       Past Surgical History:   Procedure Laterality Date    CHOLECYSTECTOMY      KNEE ARTHROSCOPY Left 08/2018    TOTAL KNEE ARTHROPLASTY Left 02/2020     No family history on file.   Social History     Tobacco Use    Smoking status: Never Smoker    Smokeless tobacco: Never Used   Substance Use Topics    Alcohol use: No      Current Outpatient Medications   Medication Sig Dispense Refill    irbesartan-hydrochlorothiazide (AVALIDE) 150-12.5 MG per tablet Take 1 tablet by mouth daily 90 tablet 1    atorvastatin (LIPITOR) 40 MG tablet Take 1 tablet by mouth daily 90 tablet 1    blood glucose test strips (ASCENSIA AUTODISC VI;ONE TOUCH ULTRA TEST VI) strip Use with associated glucose meter. 100 strip 11    Lancet Device MISC 1 each by Does not apply route continuous May use any brand 1 each 11    Blood Glucose Monitoring Suppl MISC Dispense one monitor for associated glucometer. 1 each 0    Biotin 10 MG tablet Take 10 mg by mouth daily       No current facility-administered medications for this visit. Allergies   Allergen Reactions    Ace Inhibitors Other (See Comments)     cough    Diovan Hct [Valsartan-Hydrochlorothiazide]      Hands numbed up    Mevacor [Lovastatin] Other (See Comments)     myalgia    Wellbutrin [Bupropion] Anxiety     Health Maintenance   Topic Date Due    Hepatitis C screen  1955    Pneumococcal 0-64 years Vaccine (1 of 1 - PPSV23) 05/29/1963    Diabetic retinal exam  05/29/1967    HIV screen  05/29/1972    Diabetic microalbuminuria test  05/29/1975    DTaP/Tdap/Td vaccine (1 - Tdap) 05/29/1976    Shingles Vaccine (1 of 2) 05/29/2007    Cervical cancer screen  05/07/2015    Breast cancer screen  10/02/2016    Potassium monitoring  03/14/2020    Creatinine monitoring  03/14/2020    Colon Cancer Screen FIT/FOBT  03/19/2020    Flu vaccine (1) 06/30/2021 (Originally 9/1/2020)    A1C test (Diabetic or Prediabetic)  02/01/2021    Lipid screen  02/01/2021    Diabetic foot exam  02/10/2021    Hepatitis A vaccine  Aged Out    Hib vaccine  Aged Out    Meningococcal (ACWY) vaccine  Aged Out       Objective:  /84 (Site: Right Upper Arm, Position: Sitting)   Pulse 84   Temp 98 °F (36.7 °C)   Ht 5' 5\" (1.651 m)   Wt 203 lb 9.6 oz (92.4 kg)   SpO2 98%   BMI 33.88 kg/m²   Physical Exam  Vitals signs reviewed. Constitutional:       General: She is not in acute distress. Appearance: She is well-developed. Cardiovascular:      Rate and Rhythm: Normal rate and regular rhythm. Heart sounds: No murmur.    Pulmonary:      Effort: Pulmonary effort is normal. No respiratory distress. Breath sounds: Normal breath sounds. No wheezing. Musculoskeletal:      Right lower leg: No edema. Left lower leg: No edema. Neurological:      Mental Status: She is alert. Mental status is at baseline. Psychiatric:         Mood and Affect: Mood normal.         Behavior: Behavior normal.       Small 3 mm diameter cystic lesion right cheek. No darkness. No drainage. No scaling. Impression/Plan:  1. Type 2 diabetes mellitus without complication, without long-term current use of insulin (HCC)  Chronic. Previously diet and exercise controlled. Check A1c.  - Hemoglobin A1C; Future  - Comprehensive Metabolic Panel; Future  - CBC; Future    2. Essential hypertension  Chronic. Controlled. Refill med. Check labs  - Comprehensive Metabolic Panel; Future  - CBC; Future  - irbesartan-hydroCHLOROthiazide (AVALIDE) 150-12.5 MG per tablet; Take 1 tablet by mouth daily  Dispense: 90 tablet; Refill: 1    3. Pure hypercholesterolemia  Chronic. Check status of control with lipid panel. Refill med  - Lipid Panel; Future  - atorvastatin (LIPITOR) 40 MG tablet; Take 1 tablet by mouth daily  Dispense: 90 tablet; Refill: 1    4. Colon cancer screening  - POCT Fecal Immunochemical Test (FIT); Future      She declines mammogram.    Monitor right cheek skin lesion. Unlikely cancer. They voiced understanding. All questions answered. They agreed with treatment plan. See patient instructions for any educational materials that may have been given. Discussed use, benefit, and side effects of prescribed medications. Reviewed health maintenance. (Please note that portions of this note may have been completed with a voice recognition program.  Efforts were made to edit the dictation but occasionally words are mis-transcribed.)    Return in about 6 months (around 7/13/2021) for DM, HTN.       Electronically signed by Loreto Schwartz MD on 1/13/2021 at 1:54 PM

## 2021-01-16 ENCOUNTER — NURSE ONLY (OUTPATIENT)
Dept: LAB | Age: 66
End: 2021-01-16

## 2021-01-16 DIAGNOSIS — E11.9 TYPE 2 DIABETES MELLITUS WITHOUT COMPLICATION, WITHOUT LONG-TERM CURRENT USE OF INSULIN (HCC): ICD-10-CM

## 2021-01-16 DIAGNOSIS — E78.00 PURE HYPERCHOLESTEROLEMIA: ICD-10-CM

## 2021-01-16 DIAGNOSIS — I10 ESSENTIAL HYPERTENSION: ICD-10-CM

## 2021-01-16 LAB
ALBUMIN SERPL-MCNC: 4.4 G/DL (ref 3.5–5.1)
ALP BLD-CCNC: 96 U/L (ref 38–126)
ALT SERPL-CCNC: 29 U/L (ref 11–66)
ANION GAP SERPL CALCULATED.3IONS-SCNC: 11 MEQ/L (ref 8–16)
AST SERPL-CCNC: 25 U/L (ref 5–40)
AVERAGE GLUCOSE: 126 MG/DL (ref 70–126)
BILIRUB SERPL-MCNC: 0.6 MG/DL (ref 0.3–1.2)
BUN BLDV-MCNC: 16 MG/DL (ref 7–22)
CALCIUM SERPL-MCNC: 9.9 MG/DL (ref 8.5–10.5)
CHLORIDE BLD-SCNC: 101 MEQ/L (ref 98–111)
CHOLESTEROL, TOTAL: 204 MG/DL (ref 100–199)
CO2: 27 MEQ/L (ref 23–33)
CREAT SERPL-MCNC: 0.8 MG/DL (ref 0.4–1.2)
ERYTHROCYTE [DISTWIDTH] IN BLOOD BY AUTOMATED COUNT: 13.6 % (ref 11.5–14.5)
ERYTHROCYTE [DISTWIDTH] IN BLOOD BY AUTOMATED COUNT: 46.5 FL (ref 35–45)
GFR SERPL CREATININE-BSD FRML MDRD: 72 ML/MIN/1.73M2
GLUCOSE BLD-MCNC: 126 MG/DL (ref 70–108)
HBA1C MFR BLD: 6.2 % (ref 4.4–6.4)
HCT VFR BLD CALC: 42.3 % (ref 37–47)
HDLC SERPL-MCNC: 47 MG/DL
HEMOGLOBIN: 13.4 GM/DL (ref 12–16)
LDL CHOLESTEROL CALCULATED: 112 MG/DL
MCH RBC QN AUTO: 29.3 PG (ref 26–33)
MCHC RBC AUTO-ENTMCNC: 31.7 GM/DL (ref 32.2–35.5)
MCV RBC AUTO: 92.4 FL (ref 81–99)
PLATELET # BLD: 380 THOU/MM3 (ref 130–400)
PMV BLD AUTO: 9.7 FL (ref 9.4–12.4)
POTASSIUM SERPL-SCNC: 4.2 MEQ/L (ref 3.5–5.2)
RBC # BLD: 4.58 MILL/MM3 (ref 4.2–5.4)
SODIUM BLD-SCNC: 139 MEQ/L (ref 135–145)
TOTAL PROTEIN: 8.1 G/DL (ref 6.1–8)
TRIGL SERPL-MCNC: 225 MG/DL (ref 0–199)
WBC # BLD: 8 THOU/MM3 (ref 4.8–10.8)

## 2021-01-18 ENCOUNTER — TELEPHONE (OUTPATIENT)
Dept: FAMILY MEDICINE CLINIC | Age: 66
End: 2021-01-18

## 2021-01-18 NOTE — TELEPHONE ENCOUNTER
----- Message from Isac Beasley MD sent at 1/17/2021  9:05 AM EST -----  Diabetes is well controlled with a1c 6.2%. CMP is good. Lipids are mildly elevated and improved from previous. CBC is good. Please advise patient.   Isac Beasley MD

## 2021-01-18 NOTE — TELEPHONE ENCOUNTER
Spoke with patient and gave results per Dr. Fatemeh Kuhn. Patient verbalized understanding. Per patient request, results were mailed to her home address.

## 2021-02-13 ENCOUNTER — HOSPITAL ENCOUNTER (OUTPATIENT)
Dept: GENERAL RADIOLOGY | Age: 66
Discharge: HOME | End: 2021-02-13
Payer: COMMERCIAL

## 2021-02-13 ENCOUNTER — HOSPITAL ENCOUNTER (OUTPATIENT)
Age: 66
Discharge: HOME | End: 2021-02-13
Payer: COMMERCIAL

## 2021-02-13 DIAGNOSIS — Z01.812 PRE-OPERATIVE LABORATORY EXAMINATION: ICD-10-CM

## 2021-02-13 DIAGNOSIS — I10 BENIGN HYPERTENSION: ICD-10-CM

## 2021-02-13 DIAGNOSIS — M17.11 OSTEOARTHRITIS OF RIGHT KNEE, UNSPECIFIED OSTEOARTHRITIS TYPE: ICD-10-CM

## 2021-02-13 DIAGNOSIS — Z01.818 PRE-OP TESTING: ICD-10-CM

## 2021-02-13 LAB
ANION GAP SERPL CALCULATED.3IONS-SCNC: 12 MEQ/L (ref 8–16)
BACTERIA: ABNORMAL /HPF
BASOPHILS # BLD: 0.7 %
BASOPHILS ABSOLUTE: 0.1 THOU/MM3 (ref 0–0.1)
BILIRUBIN URINE: NEGATIVE
BLOOD, URINE: ABNORMAL
BUN BLDV-MCNC: 12 MG/DL (ref 7–22)
CALCIUM SERPL-MCNC: 9.5 MG/DL (ref 8.5–10.5)
CASTS 2: ABNORMAL /LPF
CASTS UA: ABNORMAL /LPF
CHARACTER, URINE: ABNORMAL
CHLORIDE BLD-SCNC: 104 MEQ/L (ref 98–111)
CO2: 25 MEQ/L (ref 23–33)
COLOR: YELLOW
CREAT SERPL-MCNC: 0.8 MG/DL (ref 0.4–1.2)
CRYSTALS, UA: ABNORMAL
EKG ATRIAL RATE: 70 BPM
EKG P AXIS: 30 DEGREES
EKG P-R INTERVAL: 132 MS
EKG Q-T INTERVAL: 382 MS
EKG QRS DURATION: 110 MS
EKG QTC CALCULATION (BAZETT): 412 MS
EKG R AXIS: 52 DEGREES
EKG T AXIS: -24 DEGREES
EKG VENTRICULAR RATE: 70 BPM
EOSINOPHIL # BLD: 1.3 %
EOSINOPHILS ABSOLUTE: 0.1 THOU/MM3 (ref 0–0.4)
EPITHELIAL CELLS, UA: ABNORMAL /HPF
ERYTHROCYTE [DISTWIDTH] IN BLOOD BY AUTOMATED COUNT: 13.7 % (ref 11.5–14.5)
ERYTHROCYTE [DISTWIDTH] IN BLOOD BY AUTOMATED COUNT: 46.4 FL (ref 35–45)
GFR SERPL CREATININE-BSD FRML MDRD: 72 ML/MIN/1.73M2
GLUCOSE BLD-MCNC: 134 MG/DL (ref 70–108)
GLUCOSE URINE: NEGATIVE MG/DL
HCT VFR BLD CALC: 40.7 % (ref 37–47)
HEMOGLOBIN: 13.2 GM/DL (ref 12–16)
IMMATURE GRANS (ABS): 0.01 THOU/MM3 (ref 0–0.07)
IMMATURE GRANULOCYTES: 0.1 %
KETONES, URINE: NEGATIVE
LEUKOCYTE ESTERASE, URINE: ABNORMAL
LYMPHOCYTES # BLD: 32.8 %
LYMPHOCYTES ABSOLUTE: 2.5 THOU/MM3 (ref 1–4.8)
MCH RBC QN AUTO: 30 PG (ref 26–33)
MCHC RBC AUTO-ENTMCNC: 32.4 GM/DL (ref 32.2–35.5)
MCV RBC AUTO: 92.5 FL (ref 81–99)
MISCELLANEOUS 2: ABNORMAL
MONOCYTES # BLD: 7.7 %
MONOCYTES ABSOLUTE: 0.6 THOU/MM3 (ref 0.4–1.3)
NITRITE, URINE: POSITIVE
NUCLEATED RED BLOOD CELLS: 0 /100 WBC
PH UA: 7.5 (ref 5–9)
PLATELET # BLD: 353 THOU/MM3 (ref 130–400)
PMV BLD AUTO: 9.9 FL (ref 9.4–12.4)
POTASSIUM SERPL-SCNC: 4.4 MEQ/L (ref 3.5–5.2)
PROTEIN UA: ABNORMAL
RBC # BLD: 4.4 MILL/MM3 (ref 4.2–5.4)
RBC URINE: ABNORMAL /HPF
RENAL EPITHELIAL, UA: ABNORMAL
SEG NEUTROPHILS: 57.4 %
SEGMENTED NEUTROPHILS ABSOLUTE COUNT: 4.4 THOU/MM3 (ref 1.8–7.7)
SODIUM BLD-SCNC: 141 MEQ/L (ref 135–145)
SPECIFIC GRAVITY, URINE: 1.02 (ref 1–1.03)
UROBILINOGEN, URINE: 0.2 EU/DL (ref 0–1)
WBC # BLD: 7.6 THOU/MM3 (ref 4.8–10.8)
WBC UA: ABNORMAL /HPF
YEAST: ABNORMAL

## 2021-02-13 PROCEDURE — 71046 X-RAY EXAM CHEST 2 VIEWS: CPT

## 2021-02-13 PROCEDURE — 80048 BASIC METABOLIC PNL TOTAL CA: CPT

## 2021-02-13 PROCEDURE — 87086 URINE CULTURE/COLONY COUNT: CPT

## 2021-02-13 PROCEDURE — 85025 COMPLETE CBC W/AUTO DIFF WBC: CPT

## 2021-02-13 PROCEDURE — 87081 CULTURE SCREEN ONLY: CPT

## 2021-02-13 PROCEDURE — 87077 CULTURE AEROBIC IDENTIFY: CPT

## 2021-02-13 PROCEDURE — 81001 URINALYSIS AUTO W/SCOPE: CPT

## 2021-02-13 PROCEDURE — 87186 SC STD MICRODIL/AGAR DIL: CPT

## 2021-02-13 PROCEDURE — 36415 COLL VENOUS BLD VENIPUNCTURE: CPT

## 2021-02-13 PROCEDURE — 93005 ELECTROCARDIOGRAM TRACING: CPT | Performed by: PHYSICIAN ASSISTANT

## 2021-02-14 PROCEDURE — 93010 ELECTROCARDIOGRAM REPORT: CPT | Performed by: INTERNAL MEDICINE

## 2021-02-15 LAB
MRSA SCREEN: NORMAL
ORGANISM: ABNORMAL
URINE CULTURE REFLEX: ABNORMAL

## 2021-02-17 ENCOUNTER — OFFICE VISIT (OUTPATIENT)
Dept: FAMILY MEDICINE CLINIC | Age: 66
End: 2021-02-17
Payer: COMMERCIAL

## 2021-02-17 VITALS
OXYGEN SATURATION: 96 % | WEIGHT: 205.8 LBS | HEIGHT: 65 IN | SYSTOLIC BLOOD PRESSURE: 150 MMHG | DIASTOLIC BLOOD PRESSURE: 98 MMHG | TEMPERATURE: 97.3 F | BODY MASS INDEX: 34.29 KG/M2 | HEART RATE: 106 BPM

## 2021-02-17 DIAGNOSIS — Z01.818 PREOP GENERAL PHYSICAL EXAM: ICD-10-CM

## 2021-02-17 DIAGNOSIS — I10 ESSENTIAL HYPERTENSION: ICD-10-CM

## 2021-02-17 DIAGNOSIS — M17.11 ARTHRITIS OF RIGHT KNEE: Primary | ICD-10-CM

## 2021-02-17 DIAGNOSIS — E11.9 TYPE 2 DIABETES MELLITUS WITHOUT COMPLICATION, WITHOUT LONG-TERM CURRENT USE OF INSULIN (HCC): ICD-10-CM

## 2021-02-17 PROCEDURE — 99214 OFFICE O/P EST MOD 30 MIN: CPT | Performed by: FAMILY MEDICINE

## 2021-02-17 RX ORDER — CIPROFLOXACIN 750 MG/1
TABLET, FILM COATED ORAL
Status: ON HOLD | COMMUNITY
Start: 2021-02-15 | End: 2021-05-26

## 2021-02-17 ASSESSMENT — ENCOUNTER SYMPTOMS
WHEEZING: 0
SHORTNESS OF BREATH: 0
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
## 2021-02-24 ENCOUNTER — NURSE ONLY (OUTPATIENT)
Dept: FAMILY MEDICINE CLINIC | Age: 66
End: 2021-02-24

## 2021-02-24 DIAGNOSIS — I10 HYPERTENSION, UNSPECIFIED TYPE: Primary | ICD-10-CM

## 2021-04-30 DIAGNOSIS — Z12.11 COLON CANCER SCREENING: ICD-10-CM

## 2021-04-30 PROBLEM — R19.5 POSITIVE FIT (FECAL IMMUNOCHEMICAL TEST): Status: ACTIVE | Noted: 2021-04-30

## 2021-04-30 LAB
CONTROL: PRESENT
HEMOCCULT STL QL: POSITIVE

## 2021-04-30 PROCEDURE — 82274 ASSAY TEST FOR BLOOD FECAL: CPT | Performed by: FAMILY MEDICINE

## 2021-05-03 ENCOUNTER — TELEPHONE (OUTPATIENT)
Dept: FAMILY MEDICINE CLINIC | Age: 66
End: 2021-05-03

## 2021-05-03 DIAGNOSIS — R19.5 POSITIVE FIT (FECAL IMMUNOCHEMICAL TEST): Primary | ICD-10-CM

## 2021-05-03 NOTE — TELEPHONE ENCOUNTER
----- Message from Alexa Canada MD sent at 4/30/2021  8:26 PM EDT -----  Positive fit. Recommend colonoscopy to eval for colon cancer. Who does she want to see? Please advise patient.   Alexa Canada MD

## 2021-05-03 NOTE — TELEPHONE ENCOUNTER
Spoke with the patient regarding the positive fit  Patient agreeable to see whoever you would recommend to see

## 2021-05-05 ENCOUNTER — TELEPHONE (OUTPATIENT)
Dept: SURGERY | Age: 66
End: 2021-05-05

## 2021-05-14 ENCOUNTER — OFFICE VISIT (OUTPATIENT)
Dept: SURGERY | Age: 66
End: 2021-05-14
Payer: COMMERCIAL

## 2021-05-14 VITALS
RESPIRATION RATE: 14 BRPM | TEMPERATURE: 97.2 F | SYSTOLIC BLOOD PRESSURE: 140 MMHG | OXYGEN SATURATION: 98 % | BODY MASS INDEX: 34.16 KG/M2 | WEIGHT: 205 LBS | DIASTOLIC BLOOD PRESSURE: 80 MMHG | HEIGHT: 65 IN | HEART RATE: 100 BPM

## 2021-05-14 DIAGNOSIS — Z01.818 PRE-OP TESTING: ICD-10-CM

## 2021-05-14 DIAGNOSIS — R19.5 POSITIVE FIT (FECAL IMMUNOCHEMICAL TEST): Primary | ICD-10-CM

## 2021-05-14 PROCEDURE — 99242 OFF/OP CONSLTJ NEW/EST SF 20: CPT | Performed by: SURGERY

## 2021-05-14 RX ORDER — ACETAMINOPHEN 325 MG/1
650 TABLET ORAL EVERY 6 HOURS PRN
COMMUNITY

## 2021-05-14 ASSESSMENT — ENCOUNTER SYMPTOMS
RECTAL PAIN: 0
STRIDOR: 0
EYE ITCHING: 0
BLOOD IN STOOL: 0
EYE REDNESS: 0
TROUBLE SWALLOWING: 0
NAUSEA: 0
CHEST TIGHTNESS: 0
RHINORRHEA: 0
PHOTOPHOBIA: 0
SORE THROAT: 0
COUGH: 0
BACK PAIN: 0
EYE DISCHARGE: 0
DIARRHEA: 0
ABDOMINAL PAIN: 0
COLOR CHANGE: 0
FACIAL SWELLING: 0
VOICE CHANGE: 0
SINUS PAIN: 0
CONSTIPATION: 0
APNEA: 0
WHEEZING: 0
ANAL BLEEDING: 0
EYE PAIN: 0
ABDOMINAL DISTENTION: 0
SINUS PRESSURE: 0
CHOKING: 0
VOMITING: 0
SHORTNESS OF BREATH: 0

## 2021-05-14 NOTE — PROGRESS NOTES
Jose Enrique Robins. [facility], [address] [place] [address]  [phone]  New Patient Evaluation in Office    Pt Name: Lul Morataya  Date of Birth 1955   Today's Date: 5/14/2021  Medical Record Number: [id number]  Referring Provider: Luisana Laura MD  Primary Care Provider: Willie James MD  Chief Complaint   Patient presents with   Chaya Quinones Surgical Consult     New patient-referred by Dr Aileen Rnedon FIT-needs colonscopy-no prior colonoscopies     ASSESSMENT       Diagnosis Orders   1. Positive FIT (fecal immunochemical test)  COLONOSCOPY W/ OR W/O BIOPSY    COVID-19   2. Pre-op testing  COVID-19     Past Medical History:   Diagnosis Date    Depression     Hyperlipidemia     Hypertension     Type 2 diabetes mellitus ([facility] [address]     diet controlled          PLANS      1. Schedule Lisa for colonoscopy with possible biopsy/polypectomy  2. Potential diagnostic/therapeutic options and alternatives were discussed with the patient in the office. Potential risks of bleeding, infection, perforation and missed lesions was reviewed. Potential for biopsy and/or polypectomy was discussed. We also discussed the use of IV sedation and colon preparation instructions. The patient was given an opportunity to ask questions. Once answered, the patient was agreeable to proceed with the procedure. 3. Status: Outpatient in endoscopy  4. Planned anesthesia: IV sedation provided by myself  5. Perioperative bowel preparation with Miralax and Dulcolax. Instructions given and questions answered. Lalito Heck is a 63 y.o. female seen in the consultation for evaluation regarding colonoscopy. She is over the age of 50 and never had a colonoscopy.  She denies any history of previous adenomatous polyp, previous colon cancer, family history of colon cancer, rectal bleeding, melena, iron deficiency anemia, chronic abdominal pain, change in bowel habits, unexplained masses or organomegaly. no evidence of hernia. Percussion: Normal without hepatosplenomegally. RECTAL: deferred, not clinically indicated  NEUROLOGIC: There are no focalizing motor or sensory deficits. CN II-XII are grossly intact. Liz Decent EXTREMITIES: no cyanosis, no clubbing and no edema. Thank you for the interesting evaluation. Further recommendations as listed above.        Electronically signed by Zahraa Huff DO on 5/14/2021 at 12:37 PM

## 2021-05-14 NOTE — PROGRESS NOTES
Subjective:      Patient ID: Agnes Norton is a 63 y.o. female. Chief Complaint   Patient presents with    Surgical Consult     New patient-referred by Dr Swati Dumont FIT-needs colonscopy-no prior colonoscopies       HPI  BP (!) 160/100 (Site: Right Upper Arm, Position: Sitting, Cuff Size: Medium Adult)   Pulse 112   Temp 97.2 °F (36.2 °C) (Tympanic)   Resp 14   Ht 5' 5\" (1.651 m)   Wt 205 lb (93 kg)   SpO2 98%   BMI 34.11 kg/m²     Review of Systems   Constitutional: Positive for appetite change. Negative for activity change, chills, diaphoresis, fatigue, fever and unexpected weight change. HENT: Negative for congestion, dental problem, drooling, ear discharge, ear pain, facial swelling, hearing loss, mouth sores, nosebleeds, postnasal drip, rhinorrhea, sinus pressure, sinus pain, sneezing, sore throat, tinnitus, trouble swallowing and voice change. Eyes: Negative for photophobia, pain, discharge, redness, itching and visual disturbance. Respiratory: Negative for apnea, cough, choking, chest tightness, shortness of breath, wheezing and stridor. Cardiovascular: Negative for chest pain, palpitations and leg swelling. Gastrointestinal: Negative for abdominal distention, abdominal pain, anal bleeding, blood in stool, constipation, diarrhea, nausea, rectal pain and vomiting. Endocrine: Negative for cold intolerance, heat intolerance, polydipsia, polyphagia and polyuria. Genitourinary: Negative for decreased urine volume, difficulty urinating, dyspareunia, dysuria, enuresis, flank pain, frequency, genital sores, hematuria, menstrual problem, pelvic pain, urgency, vaginal bleeding, vaginal discharge and vaginal pain. Musculoskeletal: Negative for arthralgias, back pain, gait problem, joint swelling, myalgias, neck pain and neck stiffness. Skin: Negative for color change, pallor, rash and wound.    Allergic/Immunologic: Negative for environmental allergies, food allergies and immunocompromised state. Neurological: Negative for dizziness, tremors, seizures, syncope, facial asymmetry, speech difficulty, weakness, light-headedness, numbness and headaches. Hematological: Negative for adenopathy. Does not bruise/bleed easily. Psychiatric/Behavioral: Negative for agitation, behavioral problems, confusion, decreased concentration, dysphoric mood, hallucinations, self-injury, sleep disturbance and suicidal ideas. The patient is nervous/anxious. The patient is not hyperactive.         Objective:   Physical Exam    Assessment:            Plan:              Mark Wang

## 2021-05-14 NOTE — LETTER
Alyssa Mcgregor DO  [address] [facility]. [address]  [place] [address]  [phone]     Pt Name: Eliezer Lane  Medical Record Number: [id number]  Date of Birth 1955   Today's Date: 5/14/2021    Sajan Sharif was seen in consultation in the office today. My assessment and plans are listed below. ASSESSMENT      Diagnosis Orders   1. Positive FIT (fecal immunochemical test)  COLONOSCOPY W/ OR W/O BIOPSY    COVID-19   2. Pre-op testing  COVID-19     Past Medical History:   Diagnosis Date    Depression     Hyperlipidemia     Hypertension     Type 2 diabetes mellitus ([facility] [address]     diet controlled         PLANS:   1. Schedule Lisa for colonoscopy with possible biopsy/polypectomy  2. Potential diagnostic/therapeutic options and alternatives were discussed with the patient in the office. Potential risks of bleeding, infection, perforation and missed lesions was reviewed. Potential for biopsy and/or polypectomy was discussed. We also discussed the use of IV sedation and colon preparation instructions. The patient was given an opportunity to ask questions. Once answered, the patient was agreeable to proceed with the procedure. 3. Status: Outpatient in endoscopy  4. Planned anesthesia: IV sedation provided by myself  5. Perioperative bowel preparation with Miralax and Dulcolax. Instructions given and questions answered. If I can provide any additional assistance or you have any concerns, please feel free to contact me. Thank you for allowing to participate in the care of your patients. Sincerely,      Haider Rico.  Parker Ryan

## 2021-05-19 ENCOUNTER — HOSPITAL ENCOUNTER (OUTPATIENT)
Age: 66
Setting detail: SPECIMEN
Discharge: HOME | End: 2021-05-19
Payer: COMMERCIAL

## 2021-05-19 DIAGNOSIS — Z01.818 PRE-OP TESTING: ICD-10-CM

## 2021-05-19 DIAGNOSIS — R19.5 POSITIVE FIT (FECAL IMMUNOCHEMICAL TEST): ICD-10-CM

## 2021-05-19 PROCEDURE — U0005 INFEC AGEN DETEC AMPLI PROBE: HCPCS

## 2021-05-19 PROCEDURE — U0003 INFECTIOUS AGENT DETECTION BY NUCLEIC ACID (DNA OR RNA); SEVERE ACUTE RESPIRATORY SYNDROME CORONAVIRUS 2 (SARS-COV-2) (CORONAVIRUS DISEASE [COVID-19]), AMPLIFIED PROBE TECHNIQUE, MAKING USE OF HIGH THROUGHPUT TECHNOLOGIES AS DESCRIBED BY CMS-2020-01-R: HCPCS

## 2021-05-21 LAB
SARS-COV-2: NOT DETECTED
SOURCE: NORMAL

## 2021-05-26 ENCOUNTER — HOSPITAL ENCOUNTER (OUTPATIENT)
Age: 66
Setting detail: OUTPATIENT SURGERY
Discharge: HOME | End: 2021-05-26
Attending: SURGERY | Admitting: SURGERY
Payer: COMMERCIAL

## 2021-05-26 VITALS
WEIGHT: 185 LBS | OXYGEN SATURATION: 80 % | TEMPERATURE: 95.8 F | SYSTOLIC BLOOD PRESSURE: 108 MMHG | RESPIRATION RATE: 16 BRPM | DIASTOLIC BLOOD PRESSURE: 62 MMHG | BODY MASS INDEX: 30.82 KG/M2 | HEART RATE: 82 BPM | HEIGHT: 65 IN

## 2021-05-26 PROCEDURE — 2720000010 HC SURG SUPPLY STERILE: Performed by: SURGERY

## 2021-05-26 PROCEDURE — 2580000003 HC RX 258: Performed by: SURGERY

## 2021-05-26 PROCEDURE — 6360000002 HC RX W HCPCS: Performed by: SURGERY

## 2021-05-26 PROCEDURE — 3609027000 HC COLONOSCOPY: Performed by: SURGERY

## 2021-05-26 PROCEDURE — 45378 DIAGNOSTIC COLONOSCOPY: CPT | Performed by: SURGERY

## 2021-05-26 PROCEDURE — 7100000010 HC PHASE II RECOVERY - FIRST 15 MIN: Performed by: SURGERY

## 2021-05-26 RX ORDER — FENTANYL CITRATE 50 UG/ML
INJECTION, SOLUTION INTRAMUSCULAR; INTRAVENOUS PRN
Status: DISCONTINUED | OUTPATIENT
Start: 2021-05-26 | End: 2021-05-26 | Stop reason: ALTCHOICE

## 2021-05-26 RX ORDER — MIDAZOLAM HYDROCHLORIDE 1 MG/ML
INJECTION INTRAMUSCULAR; INTRAVENOUS PRN
Status: DISCONTINUED | OUTPATIENT
Start: 2021-05-26 | End: 2021-05-26 | Stop reason: ALTCHOICE

## 2021-05-26 RX ORDER — SODIUM CHLORIDE 9 MG/ML
25 INJECTION, SOLUTION INTRAVENOUS PRN
Status: DISCONTINUED | OUTPATIENT
Start: 2021-05-26 | End: 2021-05-26 | Stop reason: HOSPADM

## 2021-05-26 RX ORDER — FENTANYL CITRATE 50 UG/ML
INJECTION, SOLUTION INTRAMUSCULAR; INTRAVENOUS
Status: DISCONTINUED
Start: 2021-05-26 | End: 2021-05-26 | Stop reason: HOSPADM

## 2021-05-26 RX ORDER — SODIUM CHLORIDE 0.9 % (FLUSH) 0.9 %
5-40 SYRINGE (ML) INJECTION EVERY 12 HOURS SCHEDULED
Status: DISCONTINUED | OUTPATIENT
Start: 2021-05-26 | End: 2021-05-26 | Stop reason: HOSPADM

## 2021-05-26 RX ORDER — SODIUM CHLORIDE 0.9 % (FLUSH) 0.9 %
5-40 SYRINGE (ML) INJECTION PRN
Status: DISCONTINUED | OUTPATIENT
Start: 2021-05-26 | End: 2021-05-26 | Stop reason: HOSPADM

## 2021-05-26 RX ORDER — MIDAZOLAM HYDROCHLORIDE 1 MG/ML
INJECTION INTRAMUSCULAR; INTRAVENOUS
Status: DISCONTINUED
Start: 2021-05-26 | End: 2021-05-26 | Stop reason: HOSPADM

## 2021-05-26 RX ORDER — SODIUM CHLORIDE 450 MG/100ML
INJECTION, SOLUTION INTRAVENOUS CONTINUOUS
Status: DISCONTINUED | OUTPATIENT
Start: 2021-05-26 | End: 2021-05-26 | Stop reason: HOSPADM

## 2021-05-26 RX ADMIN — SODIUM CHLORIDE: 4.5 INJECTION, SOLUTION INTRAVENOUS at 09:10

## 2021-05-26 ASSESSMENT — PAIN SCALES - GENERAL: PAINLEVEL_OUTOF10: 0

## 2021-05-26 NOTE — OP NOTE
Cherri Qureshi  RECORD OF OPERATION  PATIENT NAME: North Mascorro  MEDICAL RECORD NO. [id number]  SURGEON: Sandra Ryan D.O. [address] [facility]  PRIMARY CARE PHYSICIAN: Thomasine Goldmann, MD     PROCEDURE PERFORMED:  5/26/2021   PREOPERATIVE DIAGNOSIS:  POSITIVE FIT  POSTOPERATIVE DIAGNOSIS:  Very tortuous colon with extensive sigmoid diverticulosis  PROCEDURE PERFORMED:  Colonoscopy   ANESTHESIA:  IV sedation with 100 mcg Fentanyl and 10 mg Versed  ESTIMATED BLOOD LOSS:  None. SPECIMENS: None  COMPLICATIONS:  None immediately appreciated. DISCUSSION:  [facility] is a 63y.o.-year-old female who presented to my office and seen in evaluation at request of Thomasine Goldmann, MD. After history and physical examination was performed, potential diagnostic and therapeutic modalities discussed with the patient. Radiographic and endoscopic studies were discussed, risks, complications, benefits reviewed. She was given opportunity to ask questions, once answeredinformed consent was obtained. The patient was the Endoscopy Suite on 5/26/2021 for procedure. OPERATIVE FINDINGS:  At the time of endoscopy good prep appreciated. Scope was able to be advanced to level of cecum. The colon was extremely tortuous and extensive large mouthed sigmoid diverticulosis. No mucosal or polyp disease was identified. PROCEDURE:  The patient was brought to endoscopy suite, placed in left lateral Francois position, placed under continuous cardiac telemetry, blood pressure, pulse oximetry monitoring, placed under IV sedation with medications noted above, done in incremental fashion to achieve sedation. Digital rectal exam performed revealing adequate rectal tone, no masses palpable. Colonoscope advanced to rectum, rectum gently insufflated. Under direct visualization colonoscope was advanced entirety of colon to level of cecum, confirmed by ileocecal valve, triangle folds, appendiceal orifice.  The scope was slowly retracted, intraluminal

## 2021-05-26 NOTE — H&P
DO FRANCISCO [address] Marrero DR GENERAL SURGERY  PRE SEDATION HISTORY AND PHYSICAL      Pt Name: Christiano Anthony  MRN: [id number]  YOB: 1955  Provider Performing Procedure: DO ALESSANDRA Marrero  Primary Care Physician: Yanira Gomez MD  PRE-PROCEDURE   DNR-CCA/DNR-CC - No  Brief History/Pre-Procedure Diagnosis: POSITIVE FIT    MEDICAL HISTORY       has a past medical history of Depression, Hyperlipidemia, Hypertension, and Type 2 diabetes mellitus ([facility] [address] SURGICAL HISTORY   has a past surgical history that includes Cholecystectomy; Knee arthroscopy (Left, 08/2018); Total knee arthroplasty (Left, 02/2020); Carpal tunnel release; and Total knee arthroplasty (03/2021). ALLERGIES   Allergies as of 05/14/2021 - Review Complete 05/14/2021   Allergen Reaction Noted    Ace inhibitors Other (See Comments) 11/07/2014    Diovan hct [valsartan-hydrochlorothiazide]  11/11/2014    Mevacor [lovastatin] Other (See Comments) 11/07/2014    Wellbutrin [bupropion] Anxiety 11/07/2014     MEDICATIONS   Coumadin Use Last 7 Days: No  Antiplatelet drug therapy use last 7 days: No  Other anticoagulant use last 7 days: No  Prior to Admission medications    Medication Sig Start Date End Date Taking? Authorizing Provider   Cranberry-Vit C-Lactobacillus (RA CRANBERRY SUPPLEMENTS PO) Take by mouth    Historical Provider, MD   acetaminophen (TYLENOL) 325 MG tablet Take 650 mg by mouth every 6 hours as needed for Pain    Historical Provider, MD   ciprofloxacin (CIPRO) 750 MG tablet TAKE 1 TABLET BY MOUTH TWO TIMES A DAY 2/15/21   Historical Provider, MD   irbesartan-hydroCHLOROthiazide (AVALIDE) 150-12.5 MG per tablet Take 1 tablet by mouth daily 1/13/21   Yanira Gomez MD   atorvastatin (LIPITOR) 40 MG tablet Take 1 tablet by mouth daily 1/13/21   Yanira Gomez MD   blood glucose test strips (ASCENSIA AUTODISC VI;ONE TOUCH ULTRA TEST VI) strip Use with associated glucose meter.  3/9/20   Yanira Gomez MD Lancet Device MISC 1 each by Does not apply route continuous May use any brand 3/9/20   John Akers MD   Blood Glucose Monitoring Suppl MISC Dispense one monitor for associated glucometer. 3/9/20   John Akers MD   Biotin 10 MG tablet Take 10 mg by mouth daily    Historical Provider, MD     PHYSICAL EXAMINATION   Vitals:  height is 5' 5\" (1.651 m) and weight is 185 lb (83.9 kg). Her tympanic temperature is 97.9 °F (36.6 °C). Her blood pressure is 144/82 (abnormal) and her pulse is 103. Her respiration is 18 and oxygen saturation is 96%. Mental Status: alert, cooperative  Heart:   Heart regular rate and rhythm     Lungs:  Clear      Abdomen:  Soft, nontender       PLANNED PROCEDURE   Colonoscopy  SEDATION   Planned agent for conscious sedation: Fentanyl, Versed  ASA Classification: 2  Class 1: A normal healthy patient  Class 2: Pt with mild to moderate systemic disease  Class 3: Severe systemic disease or disturbance  Class 4: Severe systemic disorders that are already life threatening. Class 5: Moribund pt with little chances of survival, for more than 24 hours. Mallampati I Airway Classification: 2    1. Pre-procedure diagnostic studies complete and results available. Comment:    2. Previous sedation/anesthesia experiences assessed. Comment:    3. The patient is an appropriate candidate to undergo the planned procedure sedation and anesthesia. (Refer to nursing sedation/analgesia documentation record)  4. Formulation and discussion of sedation/procedure plan, risks, and expectations with patient and/or responsible adult completed. 5. Patient examined immediately prior to the procedure.  (Refer to nursing sedation/analgesia documentation record)      Electronically signed by Sreedhar Appiah DO on 5/26/2021 at 8:56 AM

## 2021-06-01 ENCOUNTER — OFFICE VISIT (OUTPATIENT)
Dept: SURGERY | Age: 66
End: 2021-06-01
Payer: COMMERCIAL

## 2021-06-01 VITALS
RESPIRATION RATE: 14 BRPM | OXYGEN SATURATION: 96 % | HEART RATE: 75 BPM | TEMPERATURE: 97.3 F | SYSTOLIC BLOOD PRESSURE: 130 MMHG | HEIGHT: 65 IN | WEIGHT: 195 LBS | DIASTOLIC BLOOD PRESSURE: 80 MMHG | BODY MASS INDEX: 32.49 KG/M2

## 2021-06-01 DIAGNOSIS — R19.5 POSITIVE FIT (FECAL IMMUNOCHEMICAL TEST): Primary | ICD-10-CM

## 2021-06-01 PROCEDURE — 99211 OFF/OP EST MAY X REQ PHY/QHP: CPT | Performed by: SURGERY

## 2021-06-01 NOTE — PROGRESS NOTES
Pain      irbesartan-hydroCHLOROthiazide (AVALIDE) 150-12.5 MG per tablet Take 1 tablet by mouth daily 90 tablet 1    atorvastatin (LIPITOR) 40 MG tablet Take 1 tablet by mouth daily 90 tablet 1    blood glucose test strips (ASCENSIA AUTODISC VI;ONE TOUCH ULTRA TEST VI) strip Use with associated glucose meter. 100 strip 11    Lancet Device MISC 1 each by Does not apply route continuous May use any brand 1 each 11    Blood Glucose Monitoring Suppl MISC Dispense one monitor for associated glucometer. 1 each 0    Biotin 10 MG tablet Take 10 mg by mouth daily       No current facility-administered medications for this visit. Allergies  is allergic to ace inhibitors, diovan hct [valsartan-hydrochlorothiazide], mevacor [lovastatin], and wellbutrin [bupropion]. Family History  family history is not on file. She was adopted. Social History   reports that she has never smoked. She has never used smokeless tobacco. She reports that she does not drink alcohol and does not use drugs. Health Screening Exams  Health Maintenance   Topic Date Due    Hepatitis C screen  Never done    Pneumococcal 0-64 years Vaccine (1 of 2 - PPSV23) Never done    Diabetic retinal exam  Never done    COVID-19 Vaccine (1) Never done    HIV screen  Never done    Diabetic microalbuminuria test  Never done    DTaP/Tdap/Td vaccine (1 - Tdap) 05/29/1976    Shingles Vaccine (1 of 2) Never done    Cervical cancer screen  05/07/2015    Breast cancer screen  10/02/2016    Diabetic foot exam  02/10/2021    Flu vaccine (Season Ended) 09/01/2021    A1C test (Diabetic or Prediabetic)  01/16/2022    Lipid screen  01/16/2022    Potassium monitoring  02/13/2022    Creatinine monitoring  02/13/2022    Colon cancer screen colonoscopy  05/26/2031    Hepatitis A vaccine  Aged Out    Hib vaccine  Aged Out    Meningococcal (ACWY) vaccine  Aged Out         OBJECTIVE    VITALS:  height is 5' 5\" (1.651 m) and weight is 195 lb (88.5 kg).  Her tympanic temperature is 97.3 °F (36.3 °C). Her blood pressure is 130/80 and her pulse is 75. Her respiration is 14 and oxygen saturation is 96%. Body mass index is 32.45 kg/m². CONSTITUTIONAL: Alert and oriented times 3, no acute distress and cooperative to examination with proper mood and affect. ABDOMEN: Normal shape. Laparoscopic scar(s) present. Normal bowel sounds. No bruits. soft, nontender, nondistended, no masses or organomegaly. no evidence of hernia. Percussion: Normal without hepatosplenomegally. Thank you for the interesting evaluation. Further recommendations as listed above.        Electronically signed by Guillermo Ramos DO on 6/1/2021 at 12:13 PM

## 2021-06-01 NOTE — LETTER
Nj DO Ike  [address] [facility]. [address]  [place] [address]  [phone]     Pt Name: Viviane Bee  Medical Record Number: [id number]  Date of Birth 1955   Today's Date: 6/1/2021    Hiren Vargas was evaluated in the office today. My assessment and plans are listed below. ASSESSMENT      Diagnosis Orders   1. Positive FIT (fecal immunochemical test)      S/p colonoscopy 5/26/21 finding a very tortuous colon and extensive diverticulosis, no polyps     Past Medical History:   Diagnosis Date    Depression     Hyperlipidemia     Hypertension     Type 2 diabetes mellitus ([facility] [address]     diet controlled         PLANS:   1. Resume routine screening protocols for colon cancer. 2. Increase fiber intake. If I can provide any additional assistance or you have any concerns, please feel free to contact me. Thank you for allowing to participate in the care of your patients. Sincerely,      Connor Reid.  Parker Ryan

## 2021-06-02 DIAGNOSIS — R19.5 POSITIVE FIT (FECAL IMMUNOCHEMICAL TEST): ICD-10-CM

## 2021-07-14 ENCOUNTER — OFFICE VISIT (OUTPATIENT)
Dept: FAMILY MEDICINE CLINIC | Age: 66
End: 2021-07-14
Payer: COMMERCIAL

## 2021-07-14 ENCOUNTER — HOSPITAL ENCOUNTER (OUTPATIENT)
Age: 66
Discharge: HOME | End: 2021-07-14
Payer: COMMERCIAL

## 2021-07-14 VITALS
TEMPERATURE: 96.9 F | BODY MASS INDEX: 33.04 KG/M2 | SYSTOLIC BLOOD PRESSURE: 132 MMHG | RESPIRATION RATE: 16 BRPM | HEIGHT: 65 IN | WEIGHT: 198.3 LBS | OXYGEN SATURATION: 96 % | DIASTOLIC BLOOD PRESSURE: 86 MMHG | HEART RATE: 82 BPM

## 2021-07-14 DIAGNOSIS — E11.9 TYPE 2 DIABETES MELLITUS WITHOUT COMPLICATION, WITHOUT LONG-TERM CURRENT USE OF INSULIN (HCC): ICD-10-CM

## 2021-07-14 DIAGNOSIS — I10 ESSENTIAL HYPERTENSION: ICD-10-CM

## 2021-07-14 DIAGNOSIS — E11.9 TYPE 2 DIABETES MELLITUS WITHOUT COMPLICATION, WITHOUT LONG-TERM CURRENT USE OF INSULIN (HCC): Primary | ICD-10-CM

## 2021-07-14 DIAGNOSIS — E78.00 PURE HYPERCHOLESTEROLEMIA: ICD-10-CM

## 2021-07-14 PROBLEM — R19.5 POSITIVE FIT (FECAL IMMUNOCHEMICAL TEST): Status: RESOLVED | Noted: 2021-04-30 | Resolved: 2021-07-14

## 2021-07-14 LAB
ANION GAP SERPL CALCULATED.3IONS-SCNC: 13 MEQ/L (ref 8–16)
BUN BLDV-MCNC: 13 MG/DL (ref 7–22)
CALCIUM SERPL-MCNC: 9.9 MG/DL (ref 8.5–10.5)
CHLORIDE BLD-SCNC: 103 MEQ/L (ref 98–111)
CO2: 25 MEQ/L (ref 23–33)
CREAT SERPL-MCNC: 0.7 MG/DL (ref 0.4–1.2)
GFR SERPL CREATININE-BSD FRML MDRD: 84 ML/MIN/1.73M2
GLUCOSE BLD-MCNC: 102 MG/DL (ref 70–108)
POTASSIUM SERPL-SCNC: 4.1 MEQ/L (ref 3.5–5.2)
SODIUM BLD-SCNC: 141 MEQ/L (ref 135–145)

## 2021-07-14 PROCEDURE — 80048 BASIC METABOLIC PNL TOTAL CA: CPT

## 2021-07-14 PROCEDURE — 36415 COLL VENOUS BLD VENIPUNCTURE: CPT

## 2021-07-14 PROCEDURE — 99214 OFFICE O/P EST MOD 30 MIN: CPT | Performed by: FAMILY MEDICINE

## 2021-07-14 PROCEDURE — 83036 HEMOGLOBIN GLYCOSYLATED A1C: CPT

## 2021-07-14 RX ORDER — IRBESARTAN AND HYDROCHLOROTHIAZIDE 150; 12.5 MG/1; MG/1
1 TABLET, FILM COATED ORAL DAILY
Qty: 90 TABLET | Refills: 1 | Status: SHIPPED | OUTPATIENT
Start: 2021-07-14 | End: 2022-01-14 | Stop reason: SDUPTHER

## 2021-07-14 RX ORDER — ATORVASTATIN CALCIUM 40 MG/1
40 TABLET, FILM COATED ORAL DAILY
Qty: 90 TABLET | Refills: 1 | Status: SHIPPED | OUTPATIENT
Start: 2021-07-14 | End: 2022-01-14 | Stop reason: SDUPTHER

## 2021-07-14 SDOH — ECONOMIC STABILITY: FOOD INSECURITY: WITHIN THE PAST 12 MONTHS, THE FOOD YOU BOUGHT JUST DIDN'T LAST AND YOU DIDN'T HAVE MONEY TO GET MORE.: NEVER TRUE

## 2021-07-14 SDOH — ECONOMIC STABILITY: FOOD INSECURITY: WITHIN THE PAST 12 MONTHS, YOU WORRIED THAT YOUR FOOD WOULD RUN OUT BEFORE YOU GOT MONEY TO BUY MORE.: NEVER TRUE

## 2021-07-14 ASSESSMENT — ENCOUNTER SYMPTOMS
SHORTNESS OF BREATH: 0
WHEEZING: 0

## 2021-07-14 ASSESSMENT — PATIENT HEALTH QUESTIONNAIRE - PHQ9
SUM OF ALL RESPONSES TO PHQ QUESTIONS 1-9: 0
SUM OF ALL RESPONSES TO PHQ9 QUESTIONS 1 & 2: 0
1. LITTLE INTEREST OR PLEASURE IN DOING THINGS: 0
SUM OF ALL RESPONSES TO PHQ QUESTIONS 1-9: 0
SUM OF ALL RESPONSES TO PHQ QUESTIONS 1-9: 0
2. FEELING DOWN, DEPRESSED OR HOPELESS: 0

## 2021-07-14 ASSESSMENT — SOCIAL DETERMINANTS OF HEALTH (SDOH): HOW HARD IS IT FOR YOU TO PAY FOR THE VERY BASICS LIKE FOOD, HOUSING, MEDICAL CARE, AND HEATING?: NOT HARD AT ALL

## 2021-07-14 NOTE — PROGRESS NOTES
[facility]  [facility]  [address] [facility]  Dept: [phone]  Dept Fax: [phone]  Loc: [phone]  PROGRESS NOTE      Visit Date: 7/14/2021    Radha Tan is a 61 y.o. female who presents today for:  Chief Complaint   Patient presents with    6 Month Follow-Up    Diabetes    Hypertension       Subjective:  HPI    6-month follow-up:    Hypertension: On Avalide. No hx of CAD. She has not checked BP at home. Type 2 diabetes: Previously diet and exercise controlled. A1c of 6.2% in jan 2021. She checks glucose once weekly. Glucose is about 120-140 mostly. Hyperlipidemia: On Lipitor. Lipids were checked in February and LDL was 112 in jan 2021. Had recent colonoscopy. Review of Systems   Constitutional: Negative for chills and fever. Respiratory: Negative for shortness of breath and wheezing. Cardiovascular: Negative for chest pain and leg swelling. Neurological: Negative for dizziness and syncope.      Patient Active Problem List   Diagnosis    Hypertension    Hyperlipidemia    Atrophic vaginitis    Diabetes mellitus, new onset ([address]    Positive FIT (fecal immunochemical test)     Past Medical History:   Diagnosis Date    Depression     Hyperlipidemia     Hypertension     Type 2 diabetes mellitus ([address]     diet controlled      Past Surgical History:   Procedure Laterality Date    CARPAL TUNNEL RELEASE      CHOLECYSTECTOMY      [place]    COLONOSCOPY Left 5/26/2021    COLONOSCOPY performed by Lewayne Siemens, DO at [address] ARTHROSCOPY Left 08/2018    [facility]    TOTAL KNEE ARTHROPLASTY Left 02/2020    [facility]    TOTAL KNEE ARTHROPLASTY  03/2021     Family History   Adopted: Yes     Social History     Tobacco Use    Smoking status: Never Smoker    Smokeless tobacco: Never Used   Substance Use Topics    Alcohol use: No      Current Outpatient Medications   Medication Sig Dispense Refill  Cranberry-Vit C-Lactobacillus (RA CRANBERRY SUPPLEMENTS PO) Take by mouth      acetaminophen (TYLENOL) 325 MG tablet Take 650 mg by mouth every 6 hours as needed for Pain      irbesartan-hydroCHLOROthiazide (AVALIDE) 150-12.5 MG per tablet Take 1 tablet by mouth daily 90 tablet 1    atorvastatin (LIPITOR) 40 MG tablet Take 1 tablet by mouth daily 90 tablet 1    blood glucose test strips (ASCENSIA AUTODISC VI;ONE TOUCH ULTRA TEST VI) strip Use with associated glucose meter. 100 strip 11    Lancet Device MISC 1 each by Does not apply route continuous May use any brand 1 each 11    Blood Glucose Monitoring Suppl MISC Dispense one monitor for associated glucometer. 1 each 0    Biotin 10 MG tablet Take 10 mg by mouth daily       No current facility-administered medications for this visit.      Allergies   Allergen Reactions    Ace Inhibitors Other (See Comments)     cough    Diovan Hct [Valsartan-Hydrochlorothiazide]      Hands numbed up    Mevacor [Lovastatin] Other (See Comments)     myalgia    Wellbutrin [Bupropion] Anxiety     Health Maintenance   Topic Date Due    Hepatitis C screen  Never done    Pneumococcal 0-64 years Vaccine (1 of 2 - PPSV23) Never done    Diabetic retinal exam  Never done    HIV screen  Never done    Diabetic microalbuminuria test  Never done    DTaP/Tdap/Td vaccine (1 - Tdap) 06/20/1998    Shingles Vaccine (1 of 2) Never done    Cervical cancer screen  05/07/2015    Breast cancer screen  10/02/2016    Diabetic foot exam  02/10/2021    COVID-19 Vaccine (1) 12/31/2021 (Originally 5/29/1967)    Flu vaccine (1) 09/01/2021    A1C test (Diabetic or Prediabetic)  01/16/2022    Lipid screen  01/16/2022    Potassium monitoring  02/13/2022    Creatinine monitoring  02/13/2022    Colon cancer screen colonoscopy  05/26/2031    Hepatitis A vaccine  Aged Out    Hib vaccine  Aged Out    Meningococcal (ACWY) vaccine  Aged Out       Objective:  /86 (Site: Left Upper Arm, Position: Sitting)   Pulse 82   Temp 96.9 °F (36.1 °C)   Resp 16   Ht 5' 5\" (1.651 m)   Wt 198 lb 4.8 oz (89.9 kg)   SpO2 96%   BMI 33.00 kg/m²   Physical Exam  Vitals reviewed. Constitutional:       General: She is not in acute distress. Appearance: She is well-developed. Cardiovascular:      Rate and Rhythm: Normal rate and regular rhythm. Heart sounds: No murmur heard. Pulmonary:      Effort: Pulmonary effort is normal. No respiratory distress. Breath sounds: Normal breath sounds. No wheezing. Musculoskeletal:      Right lower leg: No edema. Left lower leg: No edema. Neurological:      Mental Status: She is alert. Mental status is at baseline. Psychiatric:         Mood and Affect: Mood normal.         Behavior: Behavior normal.       Visual inspection:  Deformity/amputation: absent  Skin lesions/pre-ulcerative calluses: absent  Edema: right- negative, left- negative    Sensory exam:  Monofilament sensation: normal  (minimum of 5 random plantar locations tested, avoiding callused areas - > 1 area with absence of sensation is + for neuropathy)    Plus at least one of the following:  Pulses: normal,     Impression/Plan:  1. Type 2 diabetes mellitus without complication, without long-term current use of insulin (HCC)  Chronic. Check status of control with A1c. Continue diet and exercise  - Hemoglobin A1C; Future  -  DIABETES FOOT EXAM    2. Essential hypertension  Chronic. Controlled. Refill medication. Check lab  - Basic Metabolic Panel; Future  - irbesartan-hydroCHLOROthiazide (AVALIDE) 150-12.5 MG per tablet; Take 1 tablet by mouth daily  Dispense: 90 tablet; Refill: 1    3. Pure hypercholesterolemia  Chronic. LDL is 112 in January which is slightly higher than goal.  refill Lipitor  - atorvastatin (LIPITOR) 40 MG tablet; Take 1 tablet by mouth daily  Dispense: 90 tablet; Refill: 1    She declines mammogram.    Needs pap.   She will call gyn    They voiced understanding. All questions answered. They agreed with treatment plan. See patient instructions for any educational materials that may have been given. Discussed use, benefit, and side effects of prescribed medications. Reviewed health maintenance. (Please note that portions of this note may have been completed with a voice recognition program.  Efforts were made to edit the dictation but occasionally words are mis-transcribed.)    Return in about 6 months (around 1/14/2022) for DM, HTN.       Electronically signed by Hamilton Acevedo MD on 7/14/2021 at 1:33 PM

## 2021-07-15 ENCOUNTER — TELEPHONE (OUTPATIENT)
Dept: FAMILY MEDICINE CLINIC | Age: 66
End: 2021-07-15

## 2021-07-15 LAB
AVERAGE GLUCOSE: 126 MG/DL (ref 70–126)
HBA1C MFR BLD: 6.2 % (ref 4.4–6.4)

## 2021-12-09 ENCOUNTER — TELEPHONE (OUTPATIENT)
Dept: FAMILY MEDICINE CLINIC | Age: 66
End: 2021-12-09

## 2021-12-09 NOTE — TELEPHONE ENCOUNTER
Has dental appointment on 12/14. She had knee replacement and her dentist told her to check with her doctor as far as needing to take an antibiotic before her dental appointment. Please advise if she needs to take anything.

## 2021-12-10 NOTE — TELEPHONE ENCOUNTER
Current guidelines don't recommend antibiotic prophylaxis  for prevention of ortho implant infection. However if she has dental infection, then that needs treated. I was not given information on what kind of procedure this will be.

## 2021-12-16 DIAGNOSIS — E11.9 TYPE 2 DIABETES MELLITUS WITHOUT COMPLICATION, WITHOUT LONG-TERM CURRENT USE OF INSULIN (HCC): ICD-10-CM

## 2021-12-16 NOTE — TELEPHONE ENCOUNTER
Kenny Cuba called requesting a refill on the following medications:  Requested Prescriptions     Pending Prescriptions Disp Refills    blood glucose test strips (ASCENSIA AUTODISC VI;ONE TOUCH ULTRA TEST VI) strip 100 strip 11     Sig: Use with associated glucose meter.     Lancet Device MISC 1 each 11     Si each by Does not apply route continuous May use any brand       Date of last visit: 2021  Date of next visit (if applicable):Visit date not found  Date of last refill: 20  Pharmacy Name: Godwin Rodriguez LPN

## 2021-12-17 RX ORDER — SYRING-NEEDL,DISP,INSUL,0.3 ML 30 GX5/16"
1 SYRINGE, EMPTY DISPOSABLE MISCELLANEOUS CONTINUOUS
Qty: 1 EACH | Refills: 11 | Status: SHIPPED | OUTPATIENT
Start: 2021-12-17 | End: 2021-12-22 | Stop reason: CLARIF

## 2021-12-22 DIAGNOSIS — E11.9 TYPE 2 DIABETES MELLITUS WITHOUT COMPLICATION, WITHOUT LONG-TERM CURRENT USE OF INSULIN (HCC): ICD-10-CM

## 2021-12-22 RX ORDER — PEN NEEDLE, DIABETIC 31 GX5/16"
NEEDLE, DISPOSABLE MISCELLANEOUS
Qty: 100 EACH | Refills: 3 | Status: SHIPPED | OUTPATIENT
Start: 2021-12-22

## 2021-12-22 RX ORDER — LANCETS 30 GAUGE
1 EACH MISCELLANEOUS 2 TIMES DAILY
Qty: 300 EACH | Refills: 1 | Status: SHIPPED | OUTPATIENT
Start: 2021-12-22

## 2021-12-22 NOTE — TELEPHONE ENCOUNTER
Lisbet Spann called requesting a refill on the following medications:  Requested Prescriptions     Pending Prescriptions Disp Refills    Lancets MISC 300 each 1     Si each by Does not apply route 2 times daily    blood glucose test strips (ASCENSIA AUTODISC VI;ONE TOUCH ULTRA TEST VI) strip 100 strip 11     Sig: Use with associated glucose meter.        Date of last visit: 2021  Date of next visit (if applicable):Visit date not found  Date of last refill: 21  Pharmacy Name: Tremaine House LPN

## 2022-01-14 ENCOUNTER — OFFICE VISIT (OUTPATIENT)
Dept: FAMILY MEDICINE CLINIC | Age: 67
End: 2022-01-14
Payer: COMMERCIAL

## 2022-01-14 VITALS
RESPIRATION RATE: 14 BRPM | DIASTOLIC BLOOD PRESSURE: 78 MMHG | HEART RATE: 78 BPM | HEIGHT: 65 IN | WEIGHT: 200 LBS | TEMPERATURE: 96.8 F | OXYGEN SATURATION: 96 % | SYSTOLIC BLOOD PRESSURE: 140 MMHG | BODY MASS INDEX: 33.32 KG/M2

## 2022-01-14 DIAGNOSIS — E78.00 PURE HYPERCHOLESTEROLEMIA: ICD-10-CM

## 2022-01-14 DIAGNOSIS — E11.9 TYPE 2 DIABETES MELLITUS WITHOUT COMPLICATION, WITHOUT LONG-TERM CURRENT USE OF INSULIN (HCC): Primary | ICD-10-CM

## 2022-01-14 DIAGNOSIS — I10 ESSENTIAL HYPERTENSION: ICD-10-CM

## 2022-01-14 DIAGNOSIS — M76.61 RIGHT ACHILLES TENDINITIS: ICD-10-CM

## 2022-01-14 PROCEDURE — 99214 OFFICE O/P EST MOD 30 MIN: CPT | Performed by: FAMILY MEDICINE

## 2022-01-14 RX ORDER — IRBESARTAN AND HYDROCHLOROTHIAZIDE 150; 12.5 MG/1; MG/1
1 TABLET, FILM COATED ORAL DAILY
Qty: 90 TABLET | Refills: 1 | Status: SHIPPED | OUTPATIENT
Start: 2022-01-14 | End: 2022-07-26 | Stop reason: SDUPTHER

## 2022-01-14 RX ORDER — ATORVASTATIN CALCIUM 40 MG/1
40 TABLET, FILM COATED ORAL DAILY
Qty: 90 TABLET | Refills: 1 | Status: SHIPPED | OUTPATIENT
Start: 2022-01-14 | End: 2022-07-26 | Stop reason: SDUPTHER

## 2022-01-14 ASSESSMENT — PATIENT HEALTH QUESTIONNAIRE - PHQ9
SUM OF ALL RESPONSES TO PHQ QUESTIONS 1-9: 0
SUM OF ALL RESPONSES TO PHQ QUESTIONS 1-9: 0
2. FEELING DOWN, DEPRESSED OR HOPELESS: 0
SUM OF ALL RESPONSES TO PHQ QUESTIONS 1-9: 0
1. LITTLE INTEREST OR PLEASURE IN DOING THINGS: 0
SUM OF ALL RESPONSES TO PHQ QUESTIONS 1-9: 0
SUM OF ALL RESPONSES TO PHQ9 QUESTIONS 1 & 2: 0

## 2022-01-14 ASSESSMENT — ENCOUNTER SYMPTOMS
WHEEZING: 0
SHORTNESS OF BREATH: 0

## 2022-01-14 NOTE — PROGRESS NOTES
[facility]  [facility]  [address] [facility]  Dept: [phone]  Dept Fax: [phone]  Loc: [phone]  PROGRESS NOTE      Visit Date: 1/14/2022    Paige Diehl is a 61 y.o. female who presents today for:  Chief Complaint   Patient presents with    6 Month Follow-Up    Hypertension    Diabetes    Health Maintenance     pt declines flu and covid vaccine       Subjective:  HPI    6-month follow-up:    Hypertension: On Avalide. No hx of CAD. She has not checked BP at home. Type 2 diabetes: Previously diet and exercise controlled. A1c of 6.2% in July 2021. She checks glucose once weekly. Glucose is about 110-140 mostly. Hyperlipidemia: On Lipitor. Lipids were checked in january 2021 and LDL was 112. New problem:  Right achilles tendon. Intermittent pain and tightness. No injury. Does some stretching    Retired now    Review of Systems   Constitutional: Negative for chills and fever. Respiratory: Negative for shortness of breath and wheezing. Cardiovascular: Negative for chest pain and leg swelling. Neurological: Negative for dizziness and syncope.      Patient Active Problem List   Diagnosis    Hypertension    Hyperlipidemia    Atrophic vaginitis    Type 2 diabetes mellitus without complication, without long-term current use of insulin ([address]     Past Medical History:   Diagnosis Date    Depression     Hyperlipidemia     Hypertension     Type 2 diabetes mellitus ([address]     diet controlled      Past Surgical History:   Procedure Laterality Date    CARPAL TUNNEL RELEASE      [facility]ECTOMY      [facility]    COLONOSCOPY Left 05/26/2021    COLONOSCOPY performed by Samson Bush DO at [address] ARTHROSCOPY Left 08/2018    [facility]    TOTAL KNEE ARTHROPLASTY Left 02/2020    [facility]    TOTAL KNEE ARTHROPLASTY Right 03/2021     Family History   Adopted: Yes     Social History     Tobacco Use    Smoking status: Never Smoker    Smokeless tobacco: Never Used   Substance Use Topics    Alcohol use: No      Current Outpatient Medications   Medication Sig Dispense Refill    Lancets MISC 1 each by Does not apply route 2 times daily 300 each 1    blood glucose test strips (ASCENSIA AUTODISC VI;ONE TOUCH ULTRA TEST VI) strip Use with associated glucose meter. Test 2 x a day 100 strip 11    Alcohol Swabs (ALCOHOL PREP) PADS Use as needed 100 each 3    irbesartan-hydroCHLOROthiazide (AVALIDE) 150-12.5 MG per tablet Take 1 tablet by mouth daily 90 tablet 1    atorvastatin (LIPITOR) 40 MG tablet Take 1 tablet by mouth daily 90 tablet 1    Cranberry-Vit C-Lactobacillus (RA CRANBERRY SUPPLEMENTS PO) Take by mouth      acetaminophen (TYLENOL) 325 MG tablet Take 650 mg by mouth every 6 hours as needed for Pain      Blood Glucose Monitoring Suppl MISC Dispense one monitor for associated glucometer. 1 each 0    Biotin 10 MG tablet Take 10 mg by mouth daily (Patient not taking: Reported on 1/14/2022)       No current facility-administered medications for this visit.      Allergies   Allergen Reactions    Ace Inhibitors Other (See Comments)     cough    Diovan Hct [Valsartan-Hydrochlorothiazide]      Hands numbed up    Mevacor [Lovastatin] Other (See Comments)     myalgia    Wellbutrin [Bupropion] Anxiety     Health Maintenance   Topic Date Due    Hepatitis C screen  Never done    Pneumococcal 0-64 years Vaccine (1 of 2 - PPSV23) Never done    HIV screen  Never done    Diabetic microalbuminuria test  Never done    Diabetic retinal exam  Never done    Cervical cancer screen  Never done    DTaP/Tdap/Td vaccine (1 - Tdap) 06/20/1998    Shingles Vaccine (1 of 2) Never done    Breast cancer screen  10/02/2016    Lipid screen  01/16/2022    COVID-19 Vaccine (1) 12/31/2022 (Originally 5/29/1962)    Flu vaccine (1) 01/14/2023 (Originally 9/1/2021)    Diabetic foot exam  07/14/2022    A1C test (Diabetic or Prediabetic)  07/14/2022    Depression Screen  07/14/2022    Potassium monitoring  07/14/2022    Creatinine monitoring  07/14/2022    Colon cancer screen colonoscopy  05/26/2031    Hepatitis A vaccine  Aged Out    Hib vaccine  Aged Out    Meningococcal (ACWY) vaccine  Aged Out       Objective:  BP (!) 148/92 (Site: Right Upper Arm, Position: Sitting)   Pulse 78   Temp 96.8 °F (36 °C)   Resp 14   Ht 5' 5\" (1.651 m)   Wt 200 lb (90.7 kg)   SpO2 96%   BMI 33.28 kg/m²   Physical Exam  Vitals reviewed. Constitutional:       General: She is not in acute distress. Appearance: She is well-developed. Cardiovascular:      Rate and Rhythm: Normal rate and regular rhythm. Heart sounds: No murmur heard. Pulmonary:      Effort: Pulmonary effort is normal. No respiratory distress. Breath sounds: Normal breath sounds. No wheezing. Musculoskeletal:      Right lower leg: No edema. Left lower leg: No edema. Neurological:      Mental Status: She is alert. Mental status is at baseline. Psychiatric:         Mood and Affect: Mood normal.         Behavior: Behavior normal.       Right achilles:  ttp of achilles. No swelling. Impression/Plan:  1. Type 2 diabetes mellitus without complication, without long-term current use of insulin (HCC)  Chronic. Check status of control with A1c. Currently diet and exercise controlled  - CBC; Future  - Comprehensive Metabolic Panel; Future  - Hemoglobin A1C; Future    2. Essential hypertension  Chronic. Borderline uncontrolled. Nurse visit 1 week for BP. Refill meds. Check labs  - CBC; Future  - Comprehensive Metabolic Panel; Future  - irbesartan-hydroCHLOROthiazide (AVALIDE) 150-12.5 MG per tablet; Take 1 tablet by mouth daily  Dispense: 90 tablet; Refill: 1    3. Pure hypercholesterolemia  Chronic. Check status of control with lipid panel. Continue Lipitor  - Lipid Panel; Future  - atorvastatin (LIPITOR) 40 MG tablet;  Take 1 tablet by mouth daily  Dispense: 90 tablet; Refill: 1    4. Right Achilles tendinitis  Intermittent. Mild. Recommend home exercise program      She declines mammogram.    Needs pap. She has not called gyn    They voiced understanding. All questions answered. They agreed with treatment plan. See patient instructions for any educational materials that may have been given. Discussed use, benefit, and side effects of prescribed medications. Reviewed health maintenance. (Please note that portions of this note may have been completed with a voice recognition program.  Efforts were made to edit the dictation but occasionally words are mis-transcribed.)    Return in about 6 months (around 7/14/2022) for welcome to medicare visit.       Electronically signed by Lisa Constantino MD on 1/14/2022 at 11:46 AM

## 2022-03-16 LAB
ABSOLUTE BASO #: 0.1 X10E9/L (ref 0–0.2)
ABSOLUTE EOS #: 0.2 X10E9/L (ref 0–0.4)
ABSOLUTE LYMPH #: 2.5 X10E9/L (ref 1–3.5)
ABSOLUTE MONO #: 0.7 X10E9/L (ref 0–0.9)
ABSOLUTE NEUT #: 5.1 X10E9/L (ref 1.5–6.6)
ALBUMIN SERPL-MCNC: 4.2 G/DL (ref 3.2–5.3)
ALK PHOSPHATASE: 94 U/L (ref 39–130)
ALT SERPL-CCNC: 30 U/L (ref 0–31)
ANION GAP SERPL CALCULATED.3IONS-SCNC: 12 MMOL/L (ref 5–15)
AST SERPL-CCNC: 23 U/L (ref 0–41)
AVERAGE GLUCOSE: 134 MG/DL
BASOPHILS RELATIVE PERCENT: 0.6 %
BILIRUB SERPL-MCNC: 0.6 MG/DL (ref 0.3–1.2)
BUN BLDV-MCNC: 15 MG/DL (ref 5–27)
CALCIUM SERPL-MCNC: 9.4 MG/DL (ref 8.5–10.5)
CHLORIDE BLD-SCNC: 102 MMOL/L (ref 98–109)
CHOLESTEROL/HDL RATIO: 5.3 (ref 1–5)
CHOLESTEROL: 218 MG/DL (ref 150–200)
CO2: 26 MMOL/L (ref 22–32)
CREAT SERPL-MCNC: 0.8 MG/DL (ref 0.4–1)
EGFR AFRICAN AMERICAN: >60 ML/MIN/1.73SQ.M
EGFR IF NONAFRICAN AMERICAN: >60 ML/MIN/1.73SQ.M
EOSINOPHILS RELATIVE PERCENT: 1.9 %
GLUCOSE: 116 MG/DL (ref 65–99)
HBA1C MFR BLD: 6.3 % (ref 4.4–6.4)
HCT VFR BLD CALC: 39.7 % (ref 35–47)
HDLC SERPL-MCNC: 41 MG/DL
HEMOGLOBIN: 13.3 G/DL (ref 11.7–15.5)
LDL CHOLESTEROL CALCULATED: 107 MG/DL
LDL/HDL RATIO: 2.6
LYMPHOCYTE %: 29.5 %
MCH RBC QN AUTO: 29.6 PG (ref 27–34)
MCHC RBC AUTO-ENTMCNC: 33.5 G/DL (ref 32–36)
MCV RBC AUTO: 88 FL (ref 80–100)
MONOCYTES # BLD: 8 %
NEUTROPHILS RELATIVE PERCENT: 60 %
PDW BLD-RTO: 14 % (ref 11.5–15)
PLATELETS: 322 X10E9/L (ref 150–450)
PMV BLD AUTO: 8 FL (ref 7–12)
POTASSIUM SERPL-SCNC: 4 MMOL/L (ref 3.5–5)
RBC: 4.49 X10E12/L (ref 3.8–5.2)
SODIUM BLD-SCNC: 140 MMOL/L (ref 134–146)
TOTAL PROTEIN: 7.4 G/DL (ref 6–8)
TRIGL SERPL-MCNC: 348 MG/DL (ref 27–150)
VLDLC SERPL CALC-MCNC: 70 MG/DL (ref 0–30)
WBC: 8.6 X10E9/L (ref 4–11)

## 2022-03-17 ENCOUNTER — TELEPHONE (OUTPATIENT)
Dept: FAMILY MEDICINE CLINIC | Age: 67
End: 2022-03-17

## 2022-03-17 NOTE — TELEPHONE ENCOUNTER
Called pt to let her know her results and pt verbalized understanding. Pt asked if we could mail her results to her. . results mailed out.

## 2022-03-17 NOTE — TELEPHONE ENCOUNTER
Lab results. A1c is 6.3%: Diabetes is well controlled. Cholesterol: LDL of 107. Elevated total cholesterol and triglycerides. CBC and CMP are good    Please advise patient.   Susanne Bolden MD

## 2022-07-14 ENCOUNTER — TELEPHONE (OUTPATIENT)
Dept: FAMILY MEDICINE CLINIC | Age: 67
End: 2022-07-14

## 2022-07-14 ENCOUNTER — TELEMEDICINE (OUTPATIENT)
Dept: FAMILY MEDICINE CLINIC | Age: 67
End: 2022-07-14
Payer: MEDICARE

## 2022-07-14 DIAGNOSIS — U07.1 COVID-19 VIRUS INFECTION: Primary | ICD-10-CM

## 2022-07-14 DIAGNOSIS — E11.9 TYPE 2 DIABETES MELLITUS WITHOUT COMPLICATION, WITHOUT LONG-TERM CURRENT USE OF INSULIN (HCC): ICD-10-CM

## 2022-07-14 DIAGNOSIS — E78.00 PURE HYPERCHOLESTEROLEMIA: ICD-10-CM

## 2022-07-14 PROCEDURE — 99214 OFFICE O/P EST MOD 30 MIN: CPT | Performed by: FAMILY MEDICINE

## 2022-07-14 PROCEDURE — 3044F HG A1C LEVEL LT 7.0%: CPT | Performed by: FAMILY MEDICINE

## 2022-07-14 PROCEDURE — 1123F ACP DISCUSS/DSCN MKR DOCD: CPT | Performed by: FAMILY MEDICINE

## 2022-07-14 ASSESSMENT — ENCOUNTER SYMPTOMS
RHINORRHEA: 0
SORE THROAT: 0
WHEEZING: 0
SHORTNESS OF BREATH: 0
COUGH: 1

## 2022-07-14 NOTE — PROGRESS NOTES
2022    TELEHEALTH EVALUATION -- Audio/Visual (During [facility]- public health emergency)    HPI:    Edouard Beltrán (:  1955) has requested an audio/video evaluation for the following concern(s):    Fever and chills. Positive covid test yesterday  Symptoms started on 22. Taking vitamin C, D, and zinc. Has body aches. Phlegm was clear cloudy and then clear. Pulse ox about 94-96%. Eating less. drinking well. Taking tylenol and ibuprofen. Unvaccinated for covid. Review of Systems   Constitutional: Positive for chills, fatigue and fever. HENT: Negative for congestion, rhinorrhea and sore throat. Respiratory: Positive for cough. Negative for shortness of breath and wheezing. Neurological: Positive for headaches. Prior to Visit Medications    Medication Sig Taking? Authorizing Provider   irbesartan-hydroCHLOROthiazide (AVALIDE) 150-12.5 MG per tablet Take 1 tablet by mouth daily Yes Jigna Mendoza MD   atorvastatin (LIPITOR) 40 MG tablet Take 1 tablet by mouth daily Yes Jigna Mendoza MD   Cranberry-Vit C-Lactobacillus (RA CRANBERRY SUPPLEMENTS PO) Take by mouth Yes Historical Provider, MD   acetaminophen (TYLENOL) 325 MG tablet Take 650 mg by mouth every 6 hours as needed for Pain Yes Historical Provider, MD   Lancets MISC 1 each by Does not apply route 2 times daily  Jigna Mendoza MD   blood glucose test strips (ASCENSIA AUTODISC VI;ONE TOUCH ULTRA TEST VI) strip Use with associated glucose meter. Test 2 x a day  Jigna Mendoza MD   Alcohol Swabs (ALCOHOL PREP) PADS Use as needed  Jigna Mendoza MD   Blood Glucose Monitoring Suppl MISC Dispense one monitor for associated glucometer.   Jigna Mendoza MD       Social History     Tobacco Use    Smoking status: Never Smoker    Smokeless tobacco: Never Used   Substance Use Topics    Alcohol use: No    Drug use: No        Allergies   Allergen Reactions    Ace Inhibitors Other (See Comments)     cough    Diovan ([facility] [address]  Chronic. Increases risk of complications    3. Pure hypercholesterolemia  Chronic. Hold Lipitor while on paxlovid and 5 days after given interaction with Paxlovid    Will involve care coordinator. Return if symptoms worsen or fail to improve. Melanie Mendoza, was evaluated through a synchronous (real-time) audio-video encounter. The patient (or guardian if applicable) is aware that this is a billable service, which includes applicable co-pays. This Virtual Visit was conducted with patient's (and/or legal guardian's) consent. The visit was conducted pursuant to the emergency declaration under the [facility] authority and the [employer] Act. Patient identification was verified, and a caregiver was present when appropriate. The patient was located at Home: [place] [address]. Provider was located at [place] [address] ([address] [place] [address]): [address] [place] [address]. Total time spent on this encounter: Not billed by time    --Shirley Shipman MD on 7/14/2022 at 2:49 PM    An electronic signature was used to authenticate this note.

## 2022-07-14 NOTE — TELEPHONE ENCOUNTER
Lee Judd tested positive for COVID yesterday , she has been sick since Monday. VV scheduled wants to discuss medication.

## 2022-07-15 ENCOUNTER — CARE COORDINATION (OUTPATIENT)
Dept: CARE COORDINATION | Age: 67
End: 2022-07-15

## 2022-07-15 NOTE — CARE COORDINATION
Patient contacted regarding COVID-19 diagnosis. Discussed COVID-19 related testing which was available at this time. Test results were positive. Patient informed of results, if available? Yes. Ambulatory Care Manager contacted the patient by telephone to perform post discharge assessment. Call within 2 business days of discharge: Yes. Verified name and  with patient as identifiers. Provided introduction to self, and explanation of the CTN/ACM role, and reason for call due to risk factors for infection and/or exposure to COVID-19. Symptoms reviewed with patient who verbalized the following symptoms: no new symptoms  no worsening symptoms. Due to no new or worsening symptoms encounter was not routed to provider for escalation. Discussed follow-up appointments. If no appointment was previously scheduled, appointment scheduling offered: Yes. [facility] follow up appointment(s):   Future Appointments   Date Time Provider Tyree Castellanos   2022 11:45 AM Brittney Leal MD [facility] [address] [facility]     [facility] follow up appointment(s): NA    Non-face-to-face services provided:  Scheduled appointment with PCP-2022  Obtained and reviewed discharge summary and/or continuity of care documents     Advance Care Planning:   Does patient have an Advance Directive:  reviewed and current. Educated patient about risk for severe COVID-19 due to risk factors according to CDC guidelines. ACM reviewed discharge instructions, medical action plan and red flag symptoms with the patient who verbalized understanding. Discussed COVID vaccination status: No. Education provided on COVID-19 vaccination as appropriate. Discussed exposure protocols and quarantine with CDC Guidelines. Patient was given an opportunity to verbalize any questions and concerns and agrees to contact ACM or health care provider for questions related to their healthcare.     Reviewed and educated patient on any new and changed medications related to discharge diagnosis     Was patient discharged with a pulse oximeter? no      ACM provided contact information. Plan for follow-up call in 5-7 days based on severity of symptoms and risk factors. Advised on zone sheet, symptom management, reporting worsening symptoms, deep breathing exercises, staying active, and hydration.

## 2022-07-22 ENCOUNTER — CARE COORDINATION (OUTPATIENT)
Dept: CARE COORDINATION | Age: 67
End: 2022-07-22

## 2022-07-22 NOTE — CARE COORDINATION
You Patient resolved from the Care Transitions episode on 7/22/22  Discussed COVID-19 related testing which was available at this time. Test results were positive. Patient informed of results, if available? Yes    Patient/family has been provided the following resources and education related to COVID-19:                         Signs, symptoms and red flags related to COVID-19            CDC exposure and quarantine guidelines            [facility] exposure contact - [phone]            Contact for their local Department of Health                 Patient currently reports that the following symptoms have improved:  cough and no new/worsening symptoms Patient feeling much better. Continues to have small cough but no other sx's present. Has follow up appt with PCP scheduled for next week     No further outreach scheduled with this CTN/ACM. Episode of Care resolved. Patient has this CTN/ACM contact information if future needs arise.

## 2022-07-26 ENCOUNTER — TELEPHONE (OUTPATIENT)
Dept: FAMILY MEDICINE CLINIC | Age: 67
End: 2022-07-26

## 2022-07-26 ENCOUNTER — OFFICE VISIT (OUTPATIENT)
Dept: FAMILY MEDICINE CLINIC | Age: 67
End: 2022-07-26
Payer: MEDICARE

## 2022-07-26 VITALS
WEIGHT: 197.6 LBS | OXYGEN SATURATION: 97 % | RESPIRATION RATE: 16 BRPM | DIASTOLIC BLOOD PRESSURE: 70 MMHG | BODY MASS INDEX: 32.92 KG/M2 | HEART RATE: 82 BPM | TEMPERATURE: 97 F | HEIGHT: 65 IN | SYSTOLIC BLOOD PRESSURE: 130 MMHG

## 2022-07-26 DIAGNOSIS — Z78.0 POST-MENOPAUSAL: ICD-10-CM

## 2022-07-26 DIAGNOSIS — Z23 NEED FOR PNEUMOCOCCAL VACCINATION: ICD-10-CM

## 2022-07-26 DIAGNOSIS — E11.9 TYPE 2 DIABETES MELLITUS WITHOUT COMPLICATION, WITHOUT LONG-TERM CURRENT USE OF INSULIN (HCC): ICD-10-CM

## 2022-07-26 DIAGNOSIS — E78.00 PURE HYPERCHOLESTEROLEMIA: ICD-10-CM

## 2022-07-26 DIAGNOSIS — N39.3 STRESS INCONTINENCE: ICD-10-CM

## 2022-07-26 DIAGNOSIS — R35.0 URINARY FREQUENCY: ICD-10-CM

## 2022-07-26 DIAGNOSIS — I10 ESSENTIAL HYPERTENSION: Primary | ICD-10-CM

## 2022-07-26 LAB
BILIRUBIN, POC: NEGATIVE
BLOOD URINE, POC: NEGATIVE
CLARITY, POC: CLEAR
COLOR, POC: NORMAL
GLUCOSE URINE, POC: NEGATIVE
KETONES, POC: NEGATIVE
LEUKOCYTE EST, POC: NORMAL
NITRITE, POC: NEGATIVE
PH, POC: 6
PROTEIN, POC: NEGATIVE
SPECIFIC GRAVITY, POC: 1.03
UROBILINOGEN, POC: 0.2

## 2022-07-26 PROCEDURE — 90677 PCV20 VACCINE IM: CPT | Performed by: FAMILY MEDICINE

## 2022-07-26 PROCEDURE — 1123F ACP DISCUSS/DSCN MKR DOCD: CPT | Performed by: FAMILY MEDICINE

## 2022-07-26 PROCEDURE — 81003 URINALYSIS AUTO W/O SCOPE: CPT | Performed by: FAMILY MEDICINE

## 2022-07-26 PROCEDURE — 3044F HG A1C LEVEL LT 7.0%: CPT | Performed by: FAMILY MEDICINE

## 2022-07-26 PROCEDURE — 99214 OFFICE O/P EST MOD 30 MIN: CPT | Performed by: FAMILY MEDICINE

## 2022-07-26 RX ORDER — IRBESARTAN AND HYDROCHLOROTHIAZIDE 150; 12.5 MG/1; MG/1
1 TABLET, FILM COATED ORAL DAILY
Qty: 90 TABLET | Refills: 1 | Status: SHIPPED | OUTPATIENT
Start: 2022-07-26

## 2022-07-26 RX ORDER — ATORVASTATIN CALCIUM 40 MG/1
40 TABLET, FILM COATED ORAL DAILY
Qty: 90 TABLET | Refills: 1 | Status: SHIPPED | OUTPATIENT
Start: 2022-07-26

## 2022-07-26 RX ORDER — OXYBUTYNIN CHLORIDE 5 MG/1
5 TABLET, EXTENDED RELEASE ORAL DAILY
Qty: 30 TABLET | Refills: 0 | Status: SHIPPED | OUTPATIENT
Start: 2022-07-26 | End: 2022-08-15

## 2022-07-26 SDOH — ECONOMIC STABILITY: FOOD INSECURITY: WITHIN THE PAST 12 MONTHS, YOU WORRIED THAT YOUR FOOD WOULD RUN OUT BEFORE YOU GOT MONEY TO BUY MORE.: NEVER TRUE

## 2022-07-26 SDOH — ECONOMIC STABILITY: FOOD INSECURITY: WITHIN THE PAST 12 MONTHS, THE FOOD YOU BOUGHT JUST DIDN'T LAST AND YOU DIDN'T HAVE MONEY TO GET MORE.: NEVER TRUE

## 2022-07-26 ASSESSMENT — ENCOUNTER SYMPTOMS
WHEEZING: 0
COUGH: 1
SHORTNESS OF BREATH: 0

## 2022-07-26 ASSESSMENT — SOCIAL DETERMINANTS OF HEALTH (SDOH): HOW HARD IS IT FOR YOU TO PAY FOR THE VERY BASICS LIKE FOOD, HOUSING, MEDICAL CARE, AND HEATING?: NOT HARD AT ALL

## 2022-07-26 NOTE — TELEPHONE ENCOUNTER
----- Message from Byron Mckeon MD sent at 7/26/2022  2:59 PM EDT -----  Good. It does not appear there is an infection. Please advise patient.   Byron Mckeon MD

## 2022-07-26 NOTE — PROGRESS NOTES
Emir Bumpers Sherrick  1955    Are you sick today? no  Do you have allergies to medications, food, a vaccine component, or latex? no  Have you ever had a serious reaction after receiving a vaccination? no  Do you have a long-term health problem with heart, lung, kidney, or metabolic disease (e.g. diabetes), asthma, a blood disorder, no spleen, complement component deficiency, a cochlear implant, or a spinal fluid leak? Are you on long-term aspirin therapy? no  Do you have cancer, leukemia, HIV/AIDS, or any other immune system problem? no  Do you have a parent, brother, or sister with an immune system problem? no  In the past 3 months, have you taken medications that affect your immune system, such as prednisone, other steroids, or anticancer drugs; drugs for the treatment of rheumatoid arthritis, Crohn's disease, or psoriasis; or have you had radiation treatment? no  Have you had a seizure or a brain or other nervous system problem? no  During the past year, have you received a transfusion of blood or blood products, or been given immune (gamma) globulin or an antiviral drug? no  For women: Are you pregnant or is there a chance you could become pregnant during the next month? no  Have you received any vaccinations in the past 4 weeks? no    Form Completed by:  on 7/26/2022 at 12:29 PM EDT  Form Reviewed by: Marly Cee MA on 7/26/2022 at 12:29 PM EDT    Did you bring your immunization card with you?  No

## 2022-07-26 NOTE — PROGRESS NOTES
[facility]  [address] [facility]  Dept: [phone]  Dept Fax: [phone]  Loc: [phone]  PROGRESS NOTE      Visit Date: 7/26/2022    Lora Chang is a 74 y.o. female who presents today for:  Chief Complaint   Patient presents with    6 Month Follow-Up    Diabetes    Hypertension       Subjective:  Diabetes  Pertinent negatives for hypoglycemia include no dizziness. Pertinent negatives for diabetes include no chest pain. Hypertension  Pertinent negatives include no chest pain or shortness of breath. 6-month follow-up:    Hypertension: On Avalide. No hx of CAD. She has not checked BP at home. Type 2 diabetes: Previously diet and exercise controlled. A1c of 6.3% in march 2022. She checks glucose once weekly. Glucose is about 104-130s mostly. Exercise:  2-3 times per week on aerobic bike and weights. Hyperlipidemia: On Lipitor. Lipids were checked in march 2022 and LDL was 107. New problem:  Urinary frequency and urgency. Has urinary leakage sometimes with sneezing and coughing. Had covid 2 weeks ago. Treated with paxlovid    Review of Systems   Constitutional:  Negative for chills and fever. Respiratory:  Positive for cough. Negative for shortness of breath and wheezing. Cardiovascular:  Negative for chest pain. Genitourinary:  Positive for frequency and urgency. Negative for dysuria. Neurological:  Negative for dizziness, syncope and light-headedness.    Patient Active Problem List   Diagnosis    Hypertension    Hyperlipidemia    Atrophic vaginitis    Type 2 diabetes mellitus without complication, without long-term current use of insulin ([address]     Past Medical History:   Diagnosis Date    Depression     Hyperlipidemia     Hypertension     Type 2 diabetes mellitus ([address]     diet controlled      Past Surgical History:   Procedure Laterality Date    CARPAL TUNNEL RELEASE CHOLECYSTECTOMY      [place]    COLONOSCOPY Left 05/26/2021    COLONOSCOPY performed by Franki Maravilla DO at [address] [facility] ARTHROSCOPY Left 08/2018    [facility]    TOTAL KNEE ARTHROPLASTY Left 02/2020    [facility]    TOTAL KNEE ARTHROPLASTY Right 03/2021     Family History   Adopted: Yes     Social History     Tobacco Use    Smoking status: Never    Smokeless tobacco: Never   Substance Use Topics    Alcohol use: No      Current Outpatient Medications   Medication Sig Dispense Refill    irbesartan-hydroCHLOROthiazide (AVALIDE) 150-12.5 MG per tablet Take 1 tablet by mouth daily 90 tablet 1    atorvastatin (LIPITOR) 40 MG tablet Take 1 tablet by mouth daily 90 tablet 1    Lancets MISC 1 each by Does not apply route 2 times daily 300 each 1    blood glucose test strips (ASCENSIA AUTODISC VI;ONE TOUCH ULTRA TEST VI) strip Use with associated glucose meter. Test 2 x a day 100 strip 11    Alcohol Swabs (ALCOHOL PREP) PADS Use as needed 100 each 3    Cranberry-Vit C-Lactobacillus (RA CRANBERRY SUPPLEMENTS PO) Take by mouth      acetaminophen (TYLENOL) 325 MG tablet Take 650 mg by mouth every 6 hours as needed for Pain      Blood Glucose Monitoring Suppl MISC Dispense one monitor for associated glucometer. 1 each 0     No current facility-administered medications for this visit.      Allergies   Allergen Reactions    Ace Inhibitors Other (See Comments)     cough    Diovan Hct [Valsartan-Hydrochlorothiazide]      Hands numbed up    Mevacor [Lovastatin] Other (See Comments)     myalgia    Wellbutrin [Bupropion] Anxiety     Health Maintenance   Topic Date Due    Pneumococcal 65+ years Vaccine (1 - PCV) Never done    HIV screen  Never done    Diabetic microalbuminuria test  Never done    Diabetic retinal exam  Never done    Hepatitis C screen  Never done    Cervical cancer screen  Never done    DTaP/Tdap/Td vaccine (1 - Tdap) 06/20/1998    Shingles vaccine (1 of 2) Never done    DEXA (modify frequency per FRAX score)  Never done Breast cancer screen  10/02/2016    Diabetic foot exam  07/14/2022    COVID-19 Vaccine (1) 12/31/2022 (Originally 1955)    Flu vaccine (1) 09/01/2022    Depression Screen  01/14/2023    A1C test (Diabetic or Prediabetic)  03/16/2023    Lipids  03/16/2023    Colorectal Cancer Screen  05/26/2031    Hepatitis A vaccine  Aged Out    Hib vaccine  Aged Out    Meningococcal (ACWY) vaccine  Aged Out       Objective:  /70 (Site: Left Upper Arm, Position: Sitting)   Pulse 82   Temp 97 °F (36.1 °C)   Resp 16   Ht 5' 5\" (1.651 m)   Wt 197 lb 9.6 oz (89.6 kg)   SpO2 97%   BMI 32.88 kg/m²   Physical Exam  Vitals reviewed. Constitutional:       General: She is not in acute distress. Appearance: She is well-developed. Cardiovascular:      Rate and Rhythm: Normal rate and regular rhythm. Heart sounds: No murmur heard. Pulmonary:      Effort: Pulmonary effort is normal. No respiratory distress. Breath sounds: Normal breath sounds. No wheezing. Abdominal:      Palpations: Abdomen is soft. Tenderness: There is no abdominal tenderness. There is no right CVA tenderness or left CVA tenderness. Musculoskeletal:      Right lower leg: No edema. Left lower leg: No edema. Neurological:      Mental Status: She is alert. Mental status is at baseline. Psychiatric:         Mood and Affect: Mood normal.         Behavior: Behavior normal.         Impression/Plan:  1. Essential hypertension  Well-controlled. Chronic condition. Refill medication. - irbesartan-hydroCHLOROthiazide (AVALIDE) 150-12.5 MG per tablet; Take 1 tablet by mouth in the morning. Dispense: 90 tablet; Refill: 1    2. Type 2 diabetes mellitus without complication, without long-term current use of insulin (HCC)  Chronic. Controlled with lifestyle. Encouraged healthy diet and physical activity. Check labs in the next 1 to 2 months  -  DIABETES FOOT EXAM  - Hemoglobin A1C; Future  - CBC;  Future  - Comprehensive Metabolic Panel; Future  - Lipid Panel; Future    3. Pure hypercholesterolemia  Chronic. Check status of control with lipid panel. Continue Lipitor  - atorvastatin (LIPITOR) 40 MG tablet; Take 1 tablet by mouth in the morning. Dispense: 90 tablet; Refill: 1  - Lipid Panel; Future    4. Stress incontinence  New problem with urinary frequency and stress incontinence. Try Ditropan. She declines referral for pelvic therapy  - oxybutynin (DITROPAN XL) 5 MG extended release tablet; Take 1 tablet by mouth in the morning. Dispense: 30 tablet; Refill: 0    5. Urinary frequency  New problem. Likely due to stress incontinence or bladder spasms. She was unable to urinate in office today so she will bring us back urine sample to rule out infection  - POCT Urinalysis No Micro (Auto)  - oxybutynin (DITROPAN XL) 5 MG extended release tablet; Take 1 tablet by mouth in the morning. Dispense: 30 tablet; Refill: 0    6. Post-menopausal  screening  - DEXA BONE DENSITY 2 SITES; Future    7. Need for pneumococcal vaccination  - Pneumococcal, PCV20, PREVNAR 21, (age 25 yrs+), IM, PF    Declines mammogram.  She states she will call to schedule  Recommend shingles vaccine at pharmacy. They voiced understanding. All questions answered. They agreed with treatment plan. See patient instructions for any educational materials that may have been given. Discussed use, benefit, and side effects of prescribed medications. Reviewed health maintenance. (Please note that portions of this note may have been completed with a voice recognition program.  Efforts were made to edit the dictation but occasionally words are mis-transcribed.)    Return in about 6 months (around 1/26/2023) for welcome to medicare visit.       Electronically signed by Chance Ang MD on 7/26/2022 at 12:02 PM

## 2022-08-13 DIAGNOSIS — R35.0 URINARY FREQUENCY: ICD-10-CM

## 2022-08-13 DIAGNOSIS — N39.3 STRESS INCONTINENCE: ICD-10-CM

## 2022-08-15 RX ORDER — OXYBUTYNIN CHLORIDE 5 MG/1
5 TABLET, EXTENDED RELEASE ORAL DAILY
Qty: 90 TABLET | Refills: 3 | Status: SHIPPED | OUTPATIENT
Start: 2022-08-15

## 2022-08-15 NOTE — TELEPHONE ENCOUNTER
Laila Miguel called requesting a refill on the following medications:  Requested Prescriptions     Pending Prescriptions Disp Refills    oxybutynin (DITROPAN-XL) 5 MG extended release tablet [Pharmacy Med Name: Oxybutynin Chloride ER Oral Tablet Extended Release 24 Hour 5 MG] 30 tablet 3     Sig: TAKE 1 TABLET BY MOUTH IN THE MORNING       Date of last visit: 7/26/2022  Date of next visit (if applicable):1/27/2023  Date of last refill:   Pharmacy Name: Robbi Foley MA

## 2022-08-22 ENCOUNTER — TELEPHONE (OUTPATIENT)
Dept: FAMILY MEDICINE CLINIC | Age: 67
End: 2022-08-22

## 2022-08-22 NOTE — TELEPHONE ENCOUNTER
Pt called for refill for oxybutynin. A new rx was sent in on 8/15/22. Pt was not aware of new rx called in. Spoke with pharmacy and pt requested refill too soon. They will go ahead and refill for pt. Pt aware.

## 2023-01-18 LAB
ABSOLUTE BASO #: 0.05 K/UL (ref 0–0.2)
ABSOLUTE EOS #: 0.09 K/UL (ref 0–0.5)
ABSOLUTE LYMPH #: 2.5 K/UL (ref 1–4)
ABSOLUTE MONO #: 0.53 K/UL (ref 0.2–1)
ABSOLUTE NEUT #: 4.62 K/UL (ref 1.5–7.5)
ALBUMIN SERPL-MCNC: 4.6 G/DL (ref 3.5–5.2)
ALK PHOSPHATASE: 98 U/L (ref 40–140)
ALT SERPL-CCNC: 27 U/L (ref 5–40)
ANION GAP SERPL CALCULATED.3IONS-SCNC: 10 MEQ/L (ref 7–16)
AST SERPL-CCNC: 25 U/L (ref 9–40)
AVERAGE GLUCOSE: 137 MG/DL
BASOPHILS RELATIVE PERCENT: 0.6 %
BILIRUB SERPL-MCNC: 0.5 MG/DL
BUN BLDV-MCNC: 15 MG/DL (ref 8–23)
CALCIUM SERPL-MCNC: 9.4 MG/DL (ref 8.5–10.5)
CHLORIDE BLD-SCNC: 102 MEQ/L (ref 95–107)
CHOLESTEROL/HDL RATIO: 4.8 RATIO
CHOLESTEROL: 213 MG/DL
CO2: 27 MEQ/L (ref 19–31)
CREAT SERPL-MCNC: 0.79 MG/DL (ref 0.6–1.3)
EGFR IF NONAFRICAN AMERICAN: 83 ML/MIN/1.73
EOSINOPHILS RELATIVE PERCENT: 1.2 %
GLUCOSE: 127 MG/DL (ref 70–99)
HBA1C MFR BLD: 6.4 % (ref 4.2–5.6)
HCT VFR BLD CALC: 39.6 % (ref 34–45)
HDLC SERPL-MCNC: 44 MG/DL
HEMOGLOBIN: 13.4 G/DL (ref 11.5–15.5)
LDL CHOLESTEROL CALCULATED: 118 MG/DL
LDL/HDL RATIO: 2.7 RATIO
LYMPHOCYTE %: 32 %
MCH RBC QN AUTO: 29.8 PG (ref 25–33)
MCHC RBC AUTO-ENTMCNC: 33.8 G/DL (ref 31–36)
MCV RBC AUTO: 88.2 FL (ref 80–99)
MONOCYTES # BLD: 6.8 %
NEUTROPHILS RELATIVE PERCENT: 59 %
NRBC ABSOLUTE: 0
NUCLEATED RED BLOOD CELLS: 0 /100 WBC'S
PDW BLD-RTO: 12.8 % (ref 11.5–15)
PLATELETS: 342 K/UL (ref 130–400)
PMV BLD AUTO: 10.1 FL (ref 9.3–13)
POTASSIUM SERPL-SCNC: 4.4 MEQ/L (ref 3.5–5.4)
RBC: 4.49 M/UL (ref 3.8–5.4)
SODIUM BLD-SCNC: 139 MEQ/L (ref 133–146)
TOTAL PROTEIN: 7.2 G/DL (ref 6.1–8.3)
TRIGL SERPL-MCNC: 253 MG/DL
VLDLC SERPL CALC-MCNC: 51 MG/DL
WBC: 7.8 K/UL (ref 3.5–11)

## 2023-01-19 ENCOUNTER — TELEPHONE (OUTPATIENT)
Dept: FAMILY MEDICINE CLINIC | Age: 68
End: 2023-01-19

## 2023-01-19 NOTE — TELEPHONE ENCOUNTER
----- Message from Elfego Desir MD sent at 1/19/2023  7:45 AM EST -----  CMP is good. Cholesterol levels are elevated: Consider adding on Zetia at next appointment on 1/27. CBC is good. A1c is 6.4%: Diabetes is well controlled. Please advise patient.   Elfego Desir MD

## 2023-01-27 ENCOUNTER — OFFICE VISIT (OUTPATIENT)
Dept: FAMILY MEDICINE CLINIC | Age: 68
End: 2023-01-27

## 2023-01-27 ENCOUNTER — TELEPHONE (OUTPATIENT)
Dept: FAMILY MEDICINE CLINIC | Age: 68
End: 2023-01-27

## 2023-01-27 VITALS
DIASTOLIC BLOOD PRESSURE: 80 MMHG | OXYGEN SATURATION: 96 % | BODY MASS INDEX: 32.76 KG/M2 | TEMPERATURE: 98.3 F | HEART RATE: 79 BPM | SYSTOLIC BLOOD PRESSURE: 136 MMHG | WEIGHT: 196.6 LBS | HEIGHT: 65 IN | RESPIRATION RATE: 16 BRPM

## 2023-01-27 DIAGNOSIS — Z00.00 WELCOME TO MEDICARE PREVENTIVE VISIT: Primary | ICD-10-CM

## 2023-01-27 DIAGNOSIS — N39.3 STRESS INCONTINENCE: ICD-10-CM

## 2023-01-27 DIAGNOSIS — I10 ESSENTIAL HYPERTENSION: ICD-10-CM

## 2023-01-27 DIAGNOSIS — E11.9 TYPE 2 DIABETES MELLITUS WITHOUT COMPLICATION, WITHOUT LONG-TERM CURRENT USE OF INSULIN (HCC): ICD-10-CM

## 2023-01-27 DIAGNOSIS — E78.00 PURE HYPERCHOLESTEROLEMIA: ICD-10-CM

## 2023-01-27 LAB
CREATININE URINE POCT: NORMAL
MICROALBUMIN/CREAT 24H UR: NORMAL MG/G{CREAT}
MICROALBUMIN/CREAT UR-RTO: NORMAL

## 2023-01-27 RX ORDER — IRBESARTAN AND HYDROCHLOROTHIAZIDE 150; 12.5 MG/1; MG/1
1 TABLET, FILM COATED ORAL DAILY
Qty: 90 TABLET | Refills: 3 | Status: SHIPPED | OUTPATIENT
Start: 2023-01-27

## 2023-01-27 RX ORDER — ATORVASTATIN CALCIUM 80 MG/1
80 TABLET, FILM COATED ORAL DAILY
Qty: 30 TABLET | Refills: 0 | Status: SHIPPED | OUTPATIENT
Start: 2023-01-27

## 2023-01-27 RX ORDER — OXYBUTYNIN CHLORIDE 10 MG/1
10 TABLET, EXTENDED RELEASE ORAL DAILY
Qty: 90 TABLET | Refills: 1 | Status: SHIPPED | OUTPATIENT
Start: 2023-01-27

## 2023-01-27 ASSESSMENT — PATIENT HEALTH QUESTIONNAIRE - PHQ9
SUM OF ALL RESPONSES TO PHQ QUESTIONS 1-9: 0
SUM OF ALL RESPONSES TO PHQ QUESTIONS 1-9: 0
2. FEELING DOWN, DEPRESSED OR HOPELESS: 0
1. LITTLE INTEREST OR PLEASURE IN DOING THINGS: 0
SUM OF ALL RESPONSES TO PHQ QUESTIONS 1-9: 0
SUM OF ALL RESPONSES TO PHQ9 QUESTIONS 1 & 2: 0
SUM OF ALL RESPONSES TO PHQ QUESTIONS 1-9: 0

## 2023-01-27 ASSESSMENT — LIFESTYLE VARIABLES
HOW OFTEN DO YOU HAVE A DRINK CONTAINING ALCOHOL: MONTHLY OR LESS
HOW MANY STANDARD DRINKS CONTAINING ALCOHOL DO YOU HAVE ON A TYPICAL DAY: 1 OR 2

## 2023-01-27 NOTE — PROGRESS NOTES
Medicare Annual Wellness Visit    Edouard Beltrán is here for Medicare AWV (6 month follow up)    Assessment & Plan   Welcome to Medicare preventive visit  Type 2 diabetes mellitus without complication, without long-term current use of insulin ([facility]  -     POCT microalbumin  -     Comprehensive Metabolic Panel; Future  -     CBC; Future  -     Hemoglobin A1C; Future  -     Lipid Panel; Future  Essential hypertension  -     irbesartan-hydroCHLOROthiazide (AVALIDE) 150-12.5 MG per tablet; Take 1 tablet by mouth daily, Disp-90 tablet, R-3Normal    Chronic problems as above. Check labs in 6 months. Refill medications as above. She declines mammogram order. She will schedule it. Needs to do dexa. Declines flu vaccine    Recommendations for Preventive Services Due: see orders and patient instructions/AVS.  Recommended screening schedule for the next 5-10 years is provided to the patient in written form: see Patient Instructions/AVS.     Return in about 6 months (around 7/27/2023) for DM, HTN. Subjective         Glucose <150    Going on cruise in april    Patient's complete Health Risk Assessment and screening values have been reviewed and are found in Flowsheets. The following problems were reviewed today and where indicated follow up appointments were made and/or referrals ordered.     Positive Risk Factor Screenings with Interventions:                 Weight and Activity:  Physical Activity: Insufficiently Active    Days of Exercise per Week: 2 days    Minutes of Exercise per Session: 60 min     On average, how many days per week do you engage in moderate to strenuous exercise (like a brisk walk)?: 2 days  Have you lost any weight without trying in the past 3 months?: No  Body mass index: (!) 32.71    Obesity Interventions:  increase physical activity as follows: walk more             Safety:  Do you have either shower bars, grab bars, non-slip mats or non-slip surfaces in your shower or bathtub?: (!) No  Do all of your stairways have a railing or banister?: (!) No    Interventions:  Patient declined any further interventions or treatment     Advanced Directives:  Do you have a Living Will?: (!) No    Intervention:  has NO advanced directive - information provided                       Objective   Vitals:    01/27/23 1042   BP: 136/80   Site: Left Upper Arm   Position: Sitting   Cuff Size: Large Adult   Pulse: 79   Resp: 16   Temp: 98.3 °F (36.8 °C)   TempSrc: Temporal   SpO2: 96%   Weight: 196 lb 9.6 oz (89.2 kg)   Height: 5' 5\" (1.651 m)      Body mass index is 32.72 kg/m². Physical Exam        Allergies   Allergen Reactions    Ace Inhibitors Other (See Comments)     cough    Diovan Hct [Valsartan-Hydrochlorothiazide]      Hands numbed up    Mevacor [Lovastatin] Other (See Comments)     myalgia    Wellbutrin [Bupropion] Anxiety     Prior to Visit Medications    Medication Sig Taking? Authorizing Provider   CALCIUM PO Take by mouth Yes Historical Provider, MD   VITAMIN D PO Take by mouth Yes Historical Provider, MD   NONFORMULARY Occuvite vitamin Yes Historical Provider, MD   oxybutynin (DITROPAN-XL) 5 MG extended release tablet TAKE 1 TABLET BY MOUTH IN THE MORNING Yes Saritha Jean Baptiste MD   irbesartan-hydroCHLOROthiazide (AVALIDE) 150-12.5 MG per tablet Take 1 tablet by mouth in the morning. Yes Saritha Jean Baptiste MD   atorvastatin (LIPITOR) 40 MG tablet Take 1 tablet by mouth in the morning. Yes Saritha Jean Baptiste MD   Lancets MISC 1 each by Does not apply route 2 times daily Yes Saritha Jean Baptiste MD   blood glucose test strips (ASCENSIA AUTODISC VI;ONE TOUCH ULTRA TEST VI) strip Use with associated glucose meter.  Test 2 x a day Yes Saritha Jean Baptiste MD   Alcohol Swabs (ALCOHOL PREP) PADS Use as needed Yes Saritha Jean Baptiste MD   Cranberry-Vit C-Lactobacillus (RA CRANBERRY SUPPLEMENTS PO) Take by mouth Yes Historical Provider, MD   acetaminophen (TYLENOL) 325 MG tablet Take 650 mg by mouth every 6 hours as needed for Pain Yes Historical Provider, MD   Blood Glucose Monitoring Suppl MISC Dispense one monitor for associated glucometer.  Yes Isaac Goldberg, MD       [facility] (Including outside providers/suppliers regularly involved in providing care):   Patient Care Team:  Isaac Goldberg, MD as PCP - General (Family Medicine)  Isaac Goldberg, MD as PCP - [facility] Empaneled Provider     Reviewed and updated this visit:  Tobacco  Allergies  Meds  Problems  Med Hx  Surg Hx  Soc Hx  Fam Hx

## 2023-01-27 NOTE — TELEPHONE ENCOUNTER
----- Message from Esteban Paul MD sent at 1/27/2023 12:30 PM EST -----  Good. Please advise patient.   Esteban Paul MD

## 2023-01-27 NOTE — PROGRESS NOTES
[facility] PROFESSIONAL SERVS  [facility] MEDICINE  [address] [facility]  Dept: [phone]  Dept Fax: [phone]  Loc: [phone]  PROGRESS NOTE      Visit Date: 1/27/2023    Carlo Wyatt is a 74 y.o. female who presents today for:  Chief Complaint   Patient presents with    Medicare [facility]     6 month follow up       Subjective:  HPI    Hypercholesterolemia: On January 18th total cholesterol is 213 with LDL of 118 and triglycerides 253. She has been on Lipitor 40 mg. Denies myalgias    Bladder spasms: On Ditropan 5 mg. Not controlled. Medication seems to wear off in the afternoon. Has urinary urgency.   No dysuria    Review of Systems  Patient Active Problem List   Diagnosis    Hypertension    Hyperlipidemia    Atrophic vaginitis    Type 2 diabetes mellitus without complication, without long-term current use of insulin ([address]     Past Medical History:   Diagnosis Date    Depression     Hyperlipidemia     Hypertension     Type 2 diabetes mellitus ([address]     diet controlled      Past Surgical History:   Procedure Laterality Date    CARPAL TUNNEL RELEASE      CHOLECYSTECTOMY      [facility]    COLONOSCOPY Left 05/26/2021    COLONOSCOPY performed by Elizabeth Henderson DO at [facility] ARTHROSCOPY Left 08/2018    [facility]    TOTAL KNEE ARTHROPLASTY Left 02/2020    [facility]    TOTAL KNEE ARTHROPLASTY Right 03/2021     Family History   Adopted: Yes     Social History     Tobacco Use    Smoking status: Never    Smokeless tobacco: Never   Substance Use Topics    Alcohol use: No      Current Outpatient Medications   Medication Sig Dispense Refill    CALCIUM PO Take by mouth      VITAMIN D PO Take by mouth      NONFORMULARY Occuvite vitamin      irbesartan-hydroCHLOROthiazide (AVALIDE) 150-12.5 MG per tablet Take 1 tablet by mouth daily 90 tablet 3    atorvastatin (LIPITOR) 80 MG tablet Take 1 tablet by mouth daily 30 tablet 0    oxybutynin (DITROPAN-XL) 10 MG extended release tablet Take 1 tablet by mouth daily 90 tablet 1    Lancets MISC 1 each by Does not apply route 2 times daily 300 each 1    blood glucose test strips (ASCENSIA AUTODISC VI;ONE TOUCH ULTRA TEST VI) strip Use with associated glucose meter. Test 2 x a day 100 strip 11    Alcohol Swabs (ALCOHOL PREP) PADS Use as needed 100 each 3    Cranberry-Vit C-Lactobacillus (RA CRANBERRY SUPPLEMENTS PO) Take by mouth      acetaminophen (TYLENOL) 325 MG tablet Take 650 mg by mouth every 6 hours as needed for Pain      Blood Glucose Monitoring Suppl MISC Dispense one monitor for associated glucometer. 1 each 0     No current facility-administered medications for this visit.      Allergies   Allergen Reactions    Ace Inhibitors Other (See Comments)     cough    Diovan Hct [Valsartan-Hydrochlorothiazide]      Hands numbed up    Mevacor [Lovastatin] Other (See Comments)     myalgia    Wellbutrin [Bupropion] Anxiety     Health Maintenance   Topic Date Due    COVID-19 Vaccine (1) Never done    HIV screen  Never done    Diabetic Alb to Cr ratio (uACR) test  Never done    Hepatitis C screen  Never done    Cervical cancer screen  Never done    DTaP/Tdap/Td vaccine (1 - Tdap) 06/20/1998    Shingles vaccine (1 of 2) Never done    DEXA (modify frequency per FRAX score)  Never done    Breast cancer screen  10/02/2016    Flu vaccine (1) 08/01/2022    Depression Screen  01/14/2023    Diabetic foot exam  07/26/2023    Diabetic retinal exam  09/13/2023    A1C test (Diabetic or Prediabetic)  01/18/2024    Lipids  01/18/2024    GFR test (Diabetes, CKD 3-4, OR last GFR 15-59)  01/18/2024    Annual Wellness Visit (AWV)  01/28/2024    Colorectal Cancer Screen  05/26/2031    Pneumococcal 65+ years Vaccine  Completed    Hepatitis A vaccine  Aged Out    Hib vaccine  Aged Out    Meningococcal (ACWY) vaccine  Aged Out       Objective:  /80 (Site: Left Upper Arm, Position: Sitting, Cuff Size: Large Adult)   Pulse 79   Temp 98.3 °F (36.8 °C) (Temporal)   Resp 16   Ht 5' 5\" (1.651 m)   Wt 196 lb 9.6 oz (89.2 kg)   SpO2 96% Comment: Room Air  BMI 32.72 kg/m²   Physical Exam  Vitals reviewed. Constitutional:       General: She is not in acute distress. Appearance: She is well-developed. Cardiovascular:      Rate and Rhythm: Normal rate and regular rhythm. Heart sounds: No murmur heard. Pulmonary:      Effort: Pulmonary effort is normal. No respiratory distress. Breath sounds: Normal breath sounds. No wheezing. Abdominal:      Palpations: Abdomen is soft. Tenderness: There is no abdominal tenderness. There is no right CVA tenderness or left CVA tenderness. Musculoskeletal:      Right lower leg: No edema. Left lower leg: No edema. Neurological:      Mental Status: She is alert. Mental status is at baseline. Psychiatric:         Mood and Affect: Mood normal.         Behavior: Behavior normal.       Impression/Plan:  1. Pure hypercholesterolemia  Chronic. Uncontrolled. Increase Lipitor to 80 mg. Monitor for side effects. Check cholesterol labs in 6 months  - atorvastatin (LIPITOR) 80 MG tablet; Take 1 tablet by mouth daily  Dispense: 30 tablet; Refill: 0  - Lipid Panel; Future    5. Stress incontinence  Chronic. Uncontrolled. Increase Ditropan to 10 mg  - oxybutynin (DITROPAN-XL) 10 MG extended release tablet; Take 1 tablet by mouth daily  Dispense: 90 tablet; Refill: 1      They voiced understanding. All questions answered. They agreed with treatment plan. See patient instructions for any educational materials that may have been given. Discussed use, benefit, and side effects of prescribed medications. Reviewed health maintenance. (Please note that portions of this note may have been completed with a voice recognition program.  Efforts were made to edit the dictation but occasionally words are mis-transcribed.)    Return in about 6 months (around 7/27/2023) for DM, HTN.       Electronically signed by Shyanne Eric MD on 1/27/2023 at 11:09 AM

## 2023-01-27 NOTE — PATIENT INSTRUCTIONS
Advance Directives: Care Instructions  Overview  An advance directive is a legal way to state your wishes at the end of your life. It tells your family and your doctor what to do if you can't say what you want. There are two main types of advance directives. You can change them any time your wishes change. Living will. This form tells your family and your doctor your wishes about life support and other treatment. The form is also called a declaration. Medical power of . This form lets you name a person to make treatment decisions for you when you can't speak for yourself. This person is called a health care agent (health care proxy, health care surrogate). The form is also called a durable power of  for health care. If you do not have an advance directive, decisions about your medical care may be made by a family member, or by a doctor or a  who doesn't know you. It may help to think of an advance directive as a gift to the people who care for you. If you have one, they won't have to make tough decisions by themselves. For more information, including forms for your state, see the [address] website (www.caringinfo.org/planning/advance-directives/). Follow-up care is a key part of your treatment and safety. Be sure to make and go to all appointments, and call your doctor if you are having problems. It's also a good idea to know your test results and keep a list of the medicines you take. What should you include in an advance directive? Many states have a unique advance directive form. (It may ask you to address specific issues.) Or you might use a universal form that's approved by many states. If your form doesn't tell you what to address, it may be hard to know what to include in your advance directive. Use the questions below to help you get started. Who do you want to make decisions about your medical care if you are not able to?   What life-support measures do you want if you have a serious illness that gets worse over time or can't be cured? What are you most afraid of that might happen? (Maybe you're afraid of having pain, losing your independence, or being kept alive by machines.)  Where would you prefer to die? (Your home? A hospital? A nursing home?)  Do you want to donate your organs when you die? Do you want certain Faith practices performed before you die? When should you call for help? Be sure to contact your doctor if you have any questions. Where can you learn more? Go to http://www.heart.com/ and enter R264 to learn more about \"Advance Directives: Care Instructions. \"  Current as of: June 16, 2022               Content Version: 13.5  © 1815-5733 Healthwise, Incorporated. Care instructions adapted under license by [facility] ([facility]). If you have questions about a medical condition or this instruction, always ask your healthcare professional. [place] [address] any warranty or liability for your use of this information. Personalized Preventive Plan for Merlin Miguel - 1/27/2023  Medicare offers a range of preventive health benefits. Some of the tests and screenings are paid in full while other may be subject to a deductible, co-insurance, and/or copay. Some of these benefits include a comprehensive review of your medical history including lifestyle, illnesses that may run in your family, and various assessments and screenings as appropriate. After reviewing your medical record and screening and assessments performed today your provider may have ordered immunizations, labs, imaging, and/or referrals for you. A list of these orders (if applicable) as well as your Preventive Care list are included within your After Visit Summary for your review.     Other Preventive Recommendations:    A preventive eye exam performed by an eye specialist is recommended every 1-2 years to screen for glaucoma; cataracts, macular degeneration, and other eye disorders. A preventive dental visit is recommended every 6 months. Try to get at least 150 minutes of exercise per week or 10,000 steps per day on a pedometer . Order or download the FREE \"Exercise & Physical Activity: Your Everyday Guide\" from [employer]. Call 1-348.415.7930 or search [employer] online. You need 3457-5875 mg of calcium and 8848-3186 IU of vitamin D per day. It is possible to meet your calcium requirement with diet alone, but a vitamin D supplement is usually necessary to meet this goal.  When exposed to the sun, use a sunscreen that protects against both UVA and UVB radiation with an SPF of 30 or greater. Reapply every 2 to 3 hours or after sweating, drying off with a towel, or swimming. Always wear a seat belt when traveling in a car. Always wear a helmet when riding a bicycle or motorcycle.

## 2023-02-09 DIAGNOSIS — E78.00 PURE HYPERCHOLESTEROLEMIA: ICD-10-CM

## 2023-02-09 RX ORDER — ATORVASTATIN CALCIUM 40 MG/1
40 TABLET, FILM COATED ORAL DAILY
Qty: 90 TABLET | Refills: 0 | OUTPATIENT
Start: 2023-02-09

## 2023-02-27 ENCOUNTER — TELEPHONE (OUTPATIENT)
Dept: FAMILY MEDICINE CLINIC | Age: 68
End: 2023-02-27

## 2023-02-27 DIAGNOSIS — E78.00 PURE HYPERCHOLESTEROLEMIA: ICD-10-CM

## 2023-02-27 RX ORDER — ATORVASTATIN CALCIUM 80 MG/1
80 TABLET, FILM COATED ORAL DAILY
Qty: 90 TABLET | Refills: 3 | Status: SHIPPED | OUTPATIENT
Start: 2023-02-27

## 2023-02-27 NOTE — TELEPHONE ENCOUNTER
Cheryl Berkowitz called requesting a refill on the following medications:  Requested Prescriptions     Pending Prescriptions Disp Refills    atorvastatin (LIPITOR) 80 MG tablet 30 tablet 0     Sig: Take 1 tablet by mouth daily       Date of last visit: 1/27/2023  Date of next visit (if applicable):7/27/2023  Date of last refill: 1/27/2023  Pharmacy Name: [facility],  Jarad Ledbetter LPN

## 2023-02-27 NOTE — TELEPHONE ENCOUNTER
Patient needs a refill on atorvastatin 80 mg. Called into [place] [facility] [address]. She states she is not having any trouble with it.

## 2023-07-19 ENCOUNTER — HOSPITAL ENCOUNTER (EMERGENCY)
Age: 68
Discharge: HOME | End: 2023-07-19
Payer: MEDICARE

## 2023-07-19 ENCOUNTER — APPOINTMENT (OUTPATIENT)
Dept: GENERAL RADIOLOGY | Age: 68
End: 2023-07-19
Payer: MEDICARE

## 2023-07-19 VITALS
TEMPERATURE: 98 F | RESPIRATION RATE: 16 BRPM | HEIGHT: 65 IN | HEART RATE: 72 BPM | WEIGHT: 200 LBS | SYSTOLIC BLOOD PRESSURE: 185 MMHG | OXYGEN SATURATION: 98 % | BODY MASS INDEX: 33.32 KG/M2 | DIASTOLIC BLOOD PRESSURE: 89 MMHG
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
## 2023-07-19 DIAGNOSIS — S30.0XXA CONTUSION OF COCCYX, INITIAL ENCOUNTER: ICD-10-CM

## 2023-07-19 DIAGNOSIS — S20.211A RIB CONTUSION, RIGHT, INITIAL ENCOUNTER: Primary | ICD-10-CM

## 2023-07-19 PROCEDURE — 99213 OFFICE O/P EST LOW 20 MIN: CPT | Performed by: NURSE PRACTITIONER

## 2023-07-19 PROCEDURE — 72220 X-RAY EXAM SACRUM TAILBONE: CPT

## 2023-07-19 PROCEDURE — 71101 X-RAY EXAM UNILAT RIBS/CHEST: CPT

## 2023-07-19 PROCEDURE — 99213 OFFICE O/P EST LOW 20 MIN: CPT

## 2023-07-19 ASSESSMENT — PAIN - FUNCTIONAL ASSESSMENT
PAIN_FUNCTIONAL_ASSESSMENT: 0-10
PAIN_FUNCTIONAL_ASSESSMENT: PREVENTS OR INTERFERES SOME ACTIVE ACTIVITIES AND ADLS

## 2023-07-19 ASSESSMENT — PAIN SCALES - GENERAL: PAINLEVEL_OUTOF10: 8

## 2023-07-19 ASSESSMENT — PAIN DESCRIPTION - ORIENTATION: ORIENTATION: RIGHT

## 2023-07-19 ASSESSMENT — PAIN DESCRIPTION - ONSET: ONSET: SUDDEN

## 2023-07-19 ASSESSMENT — PAIN DESCRIPTION - FREQUENCY: FREQUENCY: CONTINUOUS

## 2023-07-19 ASSESSMENT — PAIN DESCRIPTION - PAIN TYPE: TYPE: ACUTE PAIN

## 2023-07-19 ASSESSMENT — ENCOUNTER SYMPTOMS
NAUSEA: 0
SHORTNESS OF BREATH: 0
VOMITING: 0

## 2023-07-19 ASSESSMENT — PAIN DESCRIPTION - LOCATION: LOCATION: BACK

## 2023-07-19 ASSESSMENT — PAIN DESCRIPTION - DESCRIPTORS: DESCRIPTORS: DISCOMFORT

## 2023-07-19 NOTE — ED NOTES
Discharge instructions and reviewed with pt. Pt verbalized understanding. Pt ambulated out in stable condition. No change in pain noted upon discharge.      Chris Landeros RN  07/19/23 8006 no

## 2023-07-19 NOTE — ED TRIAGE NOTES
Pt to urgent care due to falling down roughly 4 to 5 steps at her home. Pt states she has blankets in her hand while going down the stairs and tripped on one causing her to fall. No LOC was noted.

## 2023-07-21 LAB
ABSOLUTE BASO #: 0.06 K/UL (ref 0–0.2)
ABSOLUTE EOS #: 0.21 K/UL (ref 0–0.5)
ABSOLUTE LYMPH #: 2.33 K/UL (ref 1–4)
ABSOLUTE MONO #: 0.64 K/UL (ref 0.2–1)
ABSOLUTE NEUT #: 4.67 K/UL (ref 1.5–7.5)
ALBUMIN SERPL-MCNC: 4.5 G/DL (ref 3.5–5.2)
ALK PHOSPHATASE: 104 U/L (ref 40–142)
ALT SERPL-CCNC: 35 U/L (ref 5–40)
ANION GAP SERPL CALCULATED.3IONS-SCNC: 11 MEQ/L (ref 7–16)
AST SERPL-CCNC: 32 U/L (ref 9–40)
AVERAGE GLUCOSE: 140 MG/DL
BASOPHILS RELATIVE PERCENT: 0.8 %
BILIRUB SERPL-MCNC: 0.6 MG/DL
BUN BLDV-MCNC: 12 MG/DL (ref 8–23)
CALCIUM SERPL-MCNC: 9.7 MG/DL (ref 8.5–10.5)
CHLORIDE BLD-SCNC: 102 MEQ/L (ref 95–107)
CHOLESTEROL/HDL RATIO: 4.4 RATIO
CHOLESTEROL: 192 MG/DL
CO2: 26 MEQ/L (ref 19–31)
CREAT SERPL-MCNC: 0.8 MG/DL (ref 0.6–1.3)
EGFR IF NONAFRICAN AMERICAN: 81 ML/MIN/1.73
EOSINOPHILS RELATIVE PERCENT: 2.7 %
GLUCOSE: 117 MG/DL (ref 70–99)
HBA1C MFR BLD: 6.5 % (ref 4.2–5.6)
HCT VFR BLD CALC: 38.4 % (ref 34–45)
HDLC SERPL-MCNC: 44 MG/DL
HEMOGLOBIN: 13.4 G/DL (ref 11.5–15.5)
LDL CHOLESTEROL CALCULATED: 93 MG/DL
LDL/HDL RATIO: 2.1 RATIO
LYMPHOCYTE %: 29.4 %
MCH RBC QN AUTO: 30.2 PG (ref 25–33)
MCHC RBC AUTO-ENTMCNC: 34.9 G/DL (ref 31–36)
MCV RBC AUTO: 86.5 FL (ref 80–99)
MONOCYTES # BLD: 8.1 %
NEUTROPHILS RELATIVE PERCENT: 58.9 %
PDW BLD-RTO: 12.9 % (ref 11.5–15)
PLATELETS: 285 K/UL (ref 130–400)
PMV BLD AUTO: 10 FL (ref 9.3–13)
POTASSIUM SERPL-SCNC: 4.3 MEQ/L (ref 3.5–5.4)
RBC: 4.44 M/UL (ref 3.8–5.4)
SODIUM BLD-SCNC: 139 MEQ/L (ref 133–146)
TOTAL PROTEIN: 7.3 G/DL (ref 6.1–8.3)
TRIGL SERPL-MCNC: 276 MG/DL
VLDLC SERPL CALC-MCNC: 55 MG/DL
WBC: 7.9 K/UL (ref 3.5–11)

## 2023-07-27 ENCOUNTER — OFFICE VISIT (OUTPATIENT)
Dept: FAMILY MEDICINE CLINIC | Age: 68
End: 2023-07-27
Payer: MEDICARE

## 2023-07-27 VITALS
DIASTOLIC BLOOD PRESSURE: 82 MMHG | WEIGHT: 201.8 LBS | HEIGHT: 65 IN | HEART RATE: 68 BPM | TEMPERATURE: 98.2 F | SYSTOLIC BLOOD PRESSURE: 156 MMHG | BODY MASS INDEX: 33.62 KG/M2 | OXYGEN SATURATION: 99 % | RESPIRATION RATE: 16 BRPM

## 2023-07-27 DIAGNOSIS — E78.00 PURE HYPERCHOLESTEROLEMIA: ICD-10-CM

## 2023-07-27 DIAGNOSIS — N39.3 STRESS INCONTINENCE: ICD-10-CM

## 2023-07-27 DIAGNOSIS — I10 ESSENTIAL HYPERTENSION: ICD-10-CM

## 2023-07-27 DIAGNOSIS — E11.9 TYPE 2 DIABETES MELLITUS WITHOUT COMPLICATION, WITHOUT LONG-TERM CURRENT USE OF INSULIN (HCC): Primary | ICD-10-CM

## 2023-07-27 DIAGNOSIS — E66.09 CLASS 1 OBESITY DUE TO EXCESS CALORIES WITH SERIOUS COMORBIDITY AND BODY MASS INDEX (BMI) OF 33.0 TO 33.9 IN ADULT: ICD-10-CM

## 2023-07-27 PROCEDURE — 3077F SYST BP >= 140 MM HG: CPT | Performed by: FAMILY MEDICINE

## 2023-07-27 PROCEDURE — 3079F DIAST BP 80-89 MM HG: CPT | Performed by: FAMILY MEDICINE

## 2023-07-27 PROCEDURE — 99214 OFFICE O/P EST MOD 30 MIN: CPT | Performed by: FAMILY MEDICINE

## 2023-07-27 PROCEDURE — 3044F HG A1C LEVEL LT 7.0%: CPT | Performed by: FAMILY MEDICINE

## 2023-07-27 PROCEDURE — 1123F ACP DISCUSS/DSCN MKR DOCD: CPT | Performed by: FAMILY MEDICINE

## 2023-07-27 RX ORDER — OXYBUTYNIN CHLORIDE 10 MG/1
10 TABLET, EXTENDED RELEASE ORAL DAILY
Qty: 90 TABLET | Refills: 3 | Status: SHIPPED | OUTPATIENT
Start: 2023-07-27

## 2023-07-27 SDOH — ECONOMIC STABILITY: HOUSING INSECURITY
IN THE LAST 12 MONTHS, WAS THERE A TIME WHEN YOU DID NOT HAVE A STEADY PLACE TO SLEEP OR SLEPT IN A SHELTER (INCLUDING NOW)?: NO

## 2023-07-27 SDOH — ECONOMIC STABILITY: FOOD INSECURITY: WITHIN THE PAST 12 MONTHS, YOU WORRIED THAT YOUR FOOD WOULD RUN OUT BEFORE YOU GOT MONEY TO BUY MORE.: NEVER TRUE

## 2023-07-27 SDOH — ECONOMIC STABILITY: FOOD INSECURITY: WITHIN THE PAST 12 MONTHS, THE FOOD YOU BOUGHT JUST DIDN'T LAST AND YOU DIDN'T HAVE MONEY TO GET MORE.: NEVER TRUE

## 2023-07-27 SDOH — ECONOMIC STABILITY: INCOME INSECURITY: HOW HARD IS IT FOR YOU TO PAY FOR THE VERY BASICS LIKE FOOD, HOUSING, MEDICAL CARE, AND HEATING?: NOT HARD AT ALL

## 2023-07-27 ASSESSMENT — ENCOUNTER SYMPTOMS
COUGH: 0
SHORTNESS OF BREATH: 0
WHEEZING: 0

## 2023-07-27 NOTE — PROGRESS NOTES
[facility]  [address] [facility]  Dept: [phone]  Dept Fax: [phone]  Loc: [phone]  PROGRESS NOTE      Visit Date: 7/27/2023    Johanny Luu is a 79 y.o. female who presents today for:  Chief Complaint   Patient presents with    Diabetes     F/U Monitors BS at home usually runs 120-130s. Hypertension     Doesn't monitor B/Ps at home. Denies any issues or concerns        Subjective:  Diabetes  Pertinent negatives for hypoglycemia include no dizziness. Pertinent negatives for diabetes include no chest pain. Hypertension  Pertinent negatives include no chest pain or shortness of breath. 6-month follow-up:    Hypertension: On Avalide. No hx of CAD. She has not checked BP at home. Type 2 diabetes: Previously diet and exercise controlled. A1c of 6.5% in July 2023. She checks glucose once weekly. Glucose is about 120-130s. Exercise:  not much. Slowly increasing weight. Hyperlipidemia: On Lipitor. Lipids were checked in July 2023 and LDL was 193. Bladder spams:  On Ditropan    Fell down stairs last week. Seen in urgent care. Pain of tailbone    Review of Systems   Constitutional:  Negative for chills and fever. Respiratory:  Negative for cough, shortness of breath and wheezing. Cardiovascular:  Negative for chest pain. Neurological:  Negative for dizziness, syncope and light-headedness.    Patient Active Problem List   Diagnosis    Hypertension    Hyperlipidemia    Atrophic vaginitis    Type 2 diabetes mellitus without complication, without long-term current use of insulin ([facility])     Past Medical History:   Diagnosis Date    Depression     Hyperlipidemia     Hypertension     Type 2 diabetes mellitus ([address])     diet controlled      Past Surgical History:   Procedure Laterality Date    CARPAL TUNNEL RELEASE      CHOLECYSTECTOMY      [facility]    COLONOSCOPY Left 05/26/2021    COLONOSCOPY

## 2023-08-02 DIAGNOSIS — E11.9 TYPE 2 DIABETES MELLITUS WITHOUT COMPLICATION, WITHOUT LONG-TERM CURRENT USE OF INSULIN (HCC): ICD-10-CM

## 2023-08-02 DIAGNOSIS — E66.09 CLASS 1 OBESITY DUE TO EXCESS CALORIES WITH SERIOUS COMORBIDITY AND BODY MASS INDEX (BMI) OF 33.0 TO 33.9 IN ADULT: ICD-10-CM

## 2023-08-02 NOTE — TELEPHONE ENCOUNTER
Destiney Vasquez called requesting a refill on the following medications:  Requested Prescriptions     Pending Prescriptions Disp Refills    Semaglutide,0.25 or 0.5MG/DOS, 2 MG/1.5ML SOPN 1 Adjustable Dose Pre-filled Pen Syringe 0     Sig: Inject 0.25 mg into the skin once a week       Date of last visit: 7/27/2023  Date of next visit (if applicable):1/29/2024  Date of last refill:   Pharmacy Name: [facility]     Rx pending #1/0  Please resend to home delivery pharmacy      Thanks,  Roderick Menjivar, ADAMN

## 2023-08-10 ENCOUNTER — NURSE ONLY (OUTPATIENT)
Dept: FAMILY MEDICINE CLINIC | Age: 68
End: 2023-08-10

## 2023-08-10 VITALS — SYSTOLIC BLOOD PRESSURE: 144 MMHG | DIASTOLIC BLOOD PRESSURE: 78 MMHG

## 2023-08-10 DIAGNOSIS — I10 ESSENTIAL HYPERTENSION: ICD-10-CM

## 2023-08-10 NOTE — PROGRESS NOTES
Patient presented to the office today for a Blood Pressure check. Blood Pressure LA:  144/78    Patient did bring in a log of BP's . Scanned in media.

## 2023-08-11 RX ORDER — IRBESARTAN AND HYDROCHLOROTHIAZIDE 300; 12.5 MG/1; MG/1
1 TABLET, FILM COATED ORAL DAILY
Qty: 90 TABLET | Refills: 3 | Status: SHIPPED | OUTPATIENT
Start: 2023-08-11

## 2023-08-11 NOTE — TELEPHONE ENCOUNTER
Elevated blood pressure in office and elevated blood pressures at home. Recommend increasing Avalide to 300/12. 5. If agreeable we will need to send prescription to pharmacy. Recommend nurse visit in 2 weeks for BP    Please advise patient.   Neil Galindo MD

## 2023-08-21 ENCOUNTER — COMMUNITY OUTREACH (OUTPATIENT)
Dept: FAMILY MEDICINE CLINIC | Age: 68
End: 2023-08-21

## 2023-08-23 ENCOUNTER — NURSE ONLY (OUTPATIENT)
Dept: FAMILY MEDICINE CLINIC | Age: 68
End: 2023-08-23

## 2023-08-23 VITALS — DIASTOLIC BLOOD PRESSURE: 70 MMHG | SYSTOLIC BLOOD PRESSURE: 138 MMHG

## 2023-08-23 DIAGNOSIS — I10 ESSENTIAL HYPERTENSION: Primary | ICD-10-CM

## 2023-08-23 PROCEDURE — 99999 PR OFFICE/OUTPT VISIT,PROCEDURE ONLY: CPT | Performed by: FAMILY MEDICINE

## 2023-10-13 DIAGNOSIS — E11.9 TYPE 2 DIABETES MELLITUS WITHOUT COMPLICATION, WITHOUT LONG-TERM CURRENT USE OF INSULIN (HCC): ICD-10-CM

## 2023-10-13 RX ORDER — CALCIUM CITRATE/VITAMIN D3 200MG-6.25
TABLET ORAL
Qty: 100 STRIP | Refills: 3 | Status: SHIPPED | OUTPATIENT
Start: 2023-10-13

## 2023-10-24 ENCOUNTER — TELEPHONE (OUTPATIENT)
Dept: PHARMACY | Facility: CLINIC | Age: 68
End: 2023-10-24

## 2023-10-24 NOTE — TELEPHONE ENCOUNTER
[facility] CLINICAL PHARMACY: ADHERENCE REVIEW  Identified care gap per [place]: fills at [facility]: Diabetes adherence    Patient also appears to be prescribed: Diabetes    ASSESSMENT  DIABETES ADHERENCE    Insurance Records claims through 10/23/23 (Prior Year [address] = not reported; YTD [address] = FIRST FILL; Potential Fail Date: 10/28/23): OZEMPIC 0.25-0.5 MG/DOSE PEN last filled on 08/03/23 for 56 day supply. Next refill due: 09/28/23    Prescribed sig:  weekly    Per Reconcile Dispense History: last filled on 08/03/23 for 56 day supply. No outreach to the pharmacy     Lab Results   Component Value Date    LABA1C 6.5 (H) 07/21/2023    LABA1C 6.4 (H) 01/18/2023    LABA1C 6.3 03/16/2022     NOTE: A1c <9%    PLAN  The following are interventions that have been identified:   Patient overdue refilling OZEMPIC 0.25-0.5 MG/DOSE PEN and active on home medication list.     Reached patient to review. Patient stated she waited a while before she started taking this medication and is only on her 4th dose. She will call [facility] once she get low to get a refill.     Recent Visits  Date Type Provider Dept   07/27/23 Office Visit Adelina Mortimer, MD [facility]   01/27/23 Office Visit Kaleb Andujar MD [facility]   07/26/22 Office Visit Adelina Mortimer, MD [facility]   Showing recent visits within past 540 days with a meds authorizing provider and meeting all other requirements  Future Appointments  Date Type Provider Dept   01/29/24 Appointment Adelina Mortimer, MD [facility]   Showing future appointments within next 150 days with a meds authorizing provider and meeting all other requirements    Future Appointments   Date Time Provider [address]   1/29/2024 10:15 AM Kaleb Andujar MD [address] [place] [address] // Department, toll free [phone], Option 3    For Pharmacy Admin

## 2024-01-28 SDOH — HEALTH STABILITY: PHYSICAL HEALTH
ON AVERAGE, HOW MANY DAYS PER WEEK DO YOU ENGAGE IN MODERATE TO STRENUOUS EXERCISE (LIKE A BRISK WALK)?: PATIENT DECLINED

## 2024-01-28 ASSESSMENT — LIFESTYLE VARIABLES
HOW OFTEN DO YOU HAVE A DRINK CONTAINING ALCOHOL: 2
HOW OFTEN DO YOU HAVE A DRINK CONTAINING ALCOHOL: MONTHLY OR LESS
HOW MANY STANDARD DRINKS CONTAINING ALCOHOL DO YOU HAVE ON A TYPICAL DAY: PATIENT DOES NOT DRINK
HOW MANY STANDARD DRINKS CONTAINING ALCOHOL DO YOU HAVE ON A TYPICAL DAY: 0
HOW OFTEN DO YOU HAVE SIX OR MORE DRINKS ON ONE OCCASION: 1

## 2024-01-28 ASSESSMENT — PATIENT HEALTH QUESTIONNAIRE - PHQ9
SUM OF ALL RESPONSES TO PHQ9 QUESTIONS 1 & 2: 0
2. FEELING DOWN, DEPRESSED OR HOPELESS: 0
SUM OF ALL RESPONSES TO PHQ QUESTIONS 1-9: 0
1. LITTLE INTEREST OR PLEASURE IN DOING THINGS: 0
SUM OF ALL RESPONSES TO PHQ QUESTIONS 1-9: 0

## 2024-01-29 ENCOUNTER — OFFICE VISIT (OUTPATIENT)
Dept: FAMILY MEDICINE CLINIC | Age: 69
End: 2024-01-29
Payer: MEDICARE

## 2024-01-29 VITALS
OXYGEN SATURATION: 98 % | SYSTOLIC BLOOD PRESSURE: 142 MMHG | TEMPERATURE: 97.9 F | HEIGHT: 65 IN | RESPIRATION RATE: 16 BRPM | BODY MASS INDEX: 32.46 KG/M2 | HEART RATE: 68 BPM | DIASTOLIC BLOOD PRESSURE: 76 MMHG | WEIGHT: 194.8 LBS

## 2024-01-29 DIAGNOSIS — E78.00 PURE HYPERCHOLESTEROLEMIA: ICD-10-CM

## 2024-01-29 DIAGNOSIS — I10 PRIMARY HYPERTENSION: ICD-10-CM

## 2024-01-29 DIAGNOSIS — E11.9 TYPE 2 DIABETES MELLITUS WITHOUT COMPLICATION, WITHOUT LONG-TERM CURRENT USE OF INSULIN (HCC): ICD-10-CM

## 2024-01-29 DIAGNOSIS — Z00.00 MEDICARE ANNUAL WELLNESS VISIT, SUBSEQUENT: Primary | ICD-10-CM

## 2024-01-29 PROCEDURE — 3077F SYST BP >= 140 MM HG: CPT | Performed by: FAMILY MEDICINE

## 2024-01-29 PROCEDURE — 1123F ACP DISCUSS/DSCN MKR DOCD: CPT | Performed by: FAMILY MEDICINE

## 2024-01-29 PROCEDURE — G0439 PPPS, SUBSEQ VISIT: HCPCS | Performed by: FAMILY MEDICINE

## 2024-01-29 PROCEDURE — 3078F DIAST BP <80 MM HG: CPT | Performed by: FAMILY MEDICINE

## 2024-01-29 RX ORDER — ATORVASTATIN CALCIUM 80 MG/1
80 TABLET, FILM COATED ORAL DAILY
Qty: 90 TABLET | Refills: 3 | Status: SHIPPED | OUTPATIENT
Start: 2024-01-29

## 2024-01-29 ASSESSMENT — PATIENT HEALTH QUESTIONNAIRE - PHQ9
SUM OF ALL RESPONSES TO PHQ9 QUESTIONS 1 & 2: 0
1. LITTLE INTEREST OR PLEASURE IN DOING THINGS: 0
2. FEELING DOWN, DEPRESSED OR HOPELESS: 0
SUM OF ALL RESPONSES TO PHQ QUESTIONS 1-9: 0

## 2024-01-29 ASSESSMENT — LIFESTYLE VARIABLES
HOW OFTEN DO YOU HAVE A DRINK CONTAINING ALCOHOL: NEVER
HOW MANY STANDARD DRINKS CONTAINING ALCOHOL DO YOU HAVE ON A TYPICAL DAY: PATIENT DOES NOT DRINK

## 2024-01-29 NOTE — PROGRESS NOTES
Medicare Annual Wellness Visit    Lisa Miguel is here for Medicare AWV (Pt denies any concerns today. )    Assessment & Plan   Medicare annual wellness visit, subsequent  Type 2 diabetes mellitus without complication, without long-term current use of insulin (HCC)  -     Hemoglobin A1C; Future  -     CBC; Future  -     Comprehensive Metabolic Panel; Future  -     Lipid Panel; Future  Pure hypercholesterolemia  -     atorvastatin (LIPITOR) 80 MG tablet; Take 1 tablet by mouth daily, Disp-90 tablet, R-3Normal  -     CBC; Future  -     Lipid Panel; Future  Primary hypertension  -     CBC; Future  -     Comprehensive Metabolic Panel; Future    Type 2 diabetes: Chronic.  Controlled with lifestyle modifications.  Check A1c    Hypertension: Blood pressure is just above range on recheck.  Recommend nurse visit in 1 to 2 weeks.  Continue Avalide    Check labs and refill medications  Unable to void in office for microalbumin    Recommend flu vaccine, shingles, rsv vaccine and covid vaccine  Declines mammogram and dexa.    Recommendations for Preventive Services Due: see orders and patient instructions/AVS.  Recommended screening schedule for the next 5-10 years is provided to the patient in written form: see Patient Instructions/AVS.     Return in about 6 months (around 7/29/2024) for DM.     Subjective       DM type 2:  Home glucose 120-140s. Checks weekly. Nausea with ozempic so stopped it.     No concerns    Patient's complete Health Risk Assessment and screening values have been reviewed and are found in Flowsheets. The following problems were reviewed today and where indicated follow up appointments were made and/or referrals ordered.    Positive Risk Factor Screenings with Interventions:    Fall Risk:  Do you feel unsteady or are you worried about falling? : no  2 or more falls in past year?: no  Fall with injury in past year?: (!) yes (slid down steps and had bruising)       Interventions:    Patient declines any

## 2024-01-29 NOTE — PATIENT INSTRUCTIONS
heartbeat.   After you call 911, the  may tell you to chew 1 adult-strength or 2 to 4 low-dose aspirin. Wait for an ambulance. Do not try to drive yourself.  Watch closely for changes in your health, and be sure to contact your doctor if you have any problems.  Where can you learn more?  Go to https://www.Apture.net/patientEd and enter F075 to learn more about \"A Healthy Heart: Care Instructions.\"  Current as of: June 25, 2023               Content Version: 13.9  © 6804-4288 [employer].   Care instructions adapted under license by [employer]. If you have questions about a medical condition or this instruction, always ask your healthcare professional. [employer] disclaims any warranty or liability for your use of this information.      Personalized Preventive Plan for Lisa Miguel - 1/29/2024  Medicare offers a range of preventive health benefits. Some of the tests and screenings are paid in full while other may be subject to a deductible, co-insurance, and/or copay.    Some of these benefits include a comprehensive review of your medical history including lifestyle, illnesses that may run in your family, and various assessments and screenings as appropriate.    After reviewing your medical record and screening and assessments performed today your provider may have ordered immunizations, labs, imaging, and/or referrals for you.  A list of these orders (if applicable) as well as your Preventive Care list are included within your After Visit Summary for your review.    Other Preventive Recommendations:    A preventive eye exam performed by an eye specialist is recommended every 1-2 years to screen for glaucoma; cataracts, macular degeneration, and other eye disorders.  A preventive dental visit is recommended every 6 months.  Try to get at least 150 minutes of exercise per week or 10,000 steps per day on a pedometer .  Order or download the FREE \"Exercise & Physical

## 2024-02-14 ENCOUNTER — OFFICE VISIT (OUTPATIENT)
Dept: FAMILY MEDICINE CLINIC | Age: 69
End: 2024-02-14
Payer: MEDICARE

## 2024-02-14 VITALS
BODY MASS INDEX: 32.15 KG/M2 | HEART RATE: 88 BPM | TEMPERATURE: 98 F | OXYGEN SATURATION: 94 % | DIASTOLIC BLOOD PRESSURE: 70 MMHG | SYSTOLIC BLOOD PRESSURE: 138 MMHG | RESPIRATION RATE: 16 BRPM | HEIGHT: 65 IN | WEIGHT: 193 LBS

## 2024-02-14 DIAGNOSIS — H93.12 TINNITUS OF LEFT EAR: Primary | ICD-10-CM

## 2024-02-14 PROCEDURE — 1123F ACP DISCUSS/DSCN MKR DOCD: CPT | Performed by: NURSE PRACTITIONER

## 2024-02-14 PROCEDURE — 99214 OFFICE O/P EST MOD 30 MIN: CPT | Performed by: NURSE PRACTITIONER

## 2024-02-14 PROCEDURE — 3078F DIAST BP <80 MM HG: CPT | Performed by: NURSE PRACTITIONER

## 2024-02-14 PROCEDURE — 3075F SYST BP GE 130 - 139MM HG: CPT | Performed by: NURSE PRACTITIONER

## 2024-02-14 NOTE — PROGRESS NOTES
[facility] PROFESSIONAL [facility]  [address]  [facility] [address]  Dept: [phone]  Loc: [phone]     Lisa Miguel (:  1955) is a 68 y.o. female, here for evaluation of the following chief complaint(s):  Tinnitus (Left ear.  Pt stated she is hearing rossi code in the ear.  Stated no pain.   Started .  No vision changes or dizziness.  Pt stated she feels a little nauseated but is having a lot of anxiety at the moment.  )      ASSESSMENT/PLAN:  1. Tinnitus of left ear  -     [facility] Ear, Nose [facility] Throat  -     [facility] Audiology - [facility]    Undiagnosed new problems with uncertain prognosis. Will obtain hearing test and refer to ENT. Consulted Dr Andujar regarding plan of care. No symptoms of CVA. Patient advised of symptoms of stroke (FAST) and to call 911 if she experiences any of them.      Return for Regular follow up as scheduled and as needed.    SUBJECTIVE/OBJECTIVE:  Patient presents for clicking in her left ear. Has been present for about 2 days. No hearing change. No headache or sinus pain. No speech issues. No one sided numbness or weakness. No facial droop.        Review of Systems   Constitutional:  Negative for chills and fever.   HENT:  Negative for congestion and sore throat.    Eyes:  Negative for pain and visual disturbance.   Respiratory:  Negative for cough, chest tightness and shortness of breath.    Cardiovascular:  Negative for chest pain and palpitations.   Gastrointestinal:  Negative for abdominal pain, nausea and vomiting.   Genitourinary:  Negative for decreased urine volume and dysuria.   Skin:  Negative for rash.   Neurological:  Negative for dizziness, weakness and headaches.   Psychiatric/Behavioral:  Negative for dysphoric mood. The patient is not nervous/anxious.        Physical Exam  Vitals reviewed.   Constitutional:       General: She is not in acute distress.     Appearance: Normal appearance. 
hair removal not indicated

## 2024-02-15 PROBLEM — Z96.659 ARTIFICIAL KNEE JOINT PRESENT: Status: ACTIVE | Noted: 2024-02-15

## 2024-03-05 ENCOUNTER — HOSPITAL ENCOUNTER (OUTPATIENT)
Dept: AUDIOLOGY | Age: 69
Discharge: HOME | End: 2024-03-05
Payer: MEDICARE

## 2024-03-05 PROCEDURE — 92557 COMPREHENSIVE HEARING TEST: CPT | Performed by: AUDIOLOGIST

## 2024-03-05 PROCEDURE — 92567 TYMPANOMETRY: CPT | Performed by: AUDIOLOGIST

## 2024-03-05 NOTE — PROGRESS NOTES
AUDIOLOGICAL EVALUATION      REASON FOR TESTING:  Audiometric evaluation per the request of FRIDA Beal, due to the diagnosis of tinnitus of the left ear. The patient describes a frequent clicking \"rossi code\" in her left ear for 3-4 weeks. She reports, overall, the noise has decreased since initial onset and is not present today. She reported the noise seemed to fluctuate with changing head position. The patient denies any otalgia, otorrhea, aural fullness or vertigo. She denies any history of ear infections, previous ear surgery or TMJ disorder. She reports a high amount of stress recently. There is no significant history of loud noise exposure or familial hearing loss.    OTOSCOPY: Clear external ear canals, tympanic membranes are visible/WNL, bilaterally.     AUDIOGRAM        Reliability: Good    COMMENTS: Pure tone audiogram indicates normal low frequency sloping to mild high frequency sensorineural hearing loss, bilaterally, with a slight asymmetry noted on the left at 3000Hz and 8000Hz. Speech reception thresholds agree with pure tone averages, bilaterally. Word recognition scores are excellent, bilaterally, with speech presented at average conversation levels (50dB) in quiet. Tympanometry indicates normal ear canal volume, peak pressure and compliance, bilaterally.       RECOMMENDATION(S):   ENT consult as scheduled 4/9/24. Further testing may be considered to rule out possible retrocochlear pathology, per discretion of provider.  Repeat audiogram as medically indicated, with any noticeable changes in hearing sensitivity or tinnitus or in 6 months to rule out progression or fluctuation.

## 2024-04-09 ENCOUNTER — OFFICE VISIT (OUTPATIENT)
Dept: ENT CLINIC | Age: 69
End: 2024-04-09
Payer: MEDICARE

## 2024-04-09 VITALS
WEIGHT: 201 LBS | DIASTOLIC BLOOD PRESSURE: 70 MMHG | BODY MASS INDEX: 33.49 KG/M2 | TEMPERATURE: 97.9 F | SYSTOLIC BLOOD PRESSURE: 136 MMHG | HEIGHT: 65 IN | OXYGEN SATURATION: 97 % | HEART RATE: 96 BPM

## 2024-04-09 DIAGNOSIS — H90.3 SNHL (SENSORY-NEURAL HEARING LOSS), ASYMMETRICAL: Primary | ICD-10-CM

## 2024-04-09 DIAGNOSIS — H93.12 TINNITUS, LEFT: ICD-10-CM

## 2024-04-09 PROCEDURE — 3078F DIAST BP <80 MM HG: CPT | Performed by: PHYSICIAN ASSISTANT

## 2024-04-09 PROCEDURE — 3075F SYST BP GE 130 - 139MM HG: CPT | Performed by: PHYSICIAN ASSISTANT

## 2024-04-09 PROCEDURE — 1123F ACP DISCUSS/DSCN MKR DOCD: CPT | Performed by: PHYSICIAN ASSISTANT

## 2024-04-09 PROCEDURE — 99204 OFFICE O/P NEW MOD 45 MIN: CPT | Performed by: PHYSICIAN ASSISTANT

## 2024-04-09 NOTE — PROGRESS NOTES
[facility] PHYSICIANS [place] SPECIALTY  [facility] EAR, NOSE AND THROAT  [address]  [address]  [facility] [address]  Dept: [phone]  Dept Fax: [phone]  Loc: [phone]    Lisa Miguel is a 68 y.o. female who was referred by Brianna Aviles APRN * for:  Chief Complaint   Patient presents with    New Patient     New patient. Tinnitus of left ear. Audio ordered. Pt states that in left ear she would hear a clicking noise. Two weeks ago was the first time it had gotten really bad for about 10-15 mins.   .    HPI:     Lisa Miguel presents today for evaluation of tinnitus.  She reports she spontaneously developed left clicking sounds in her left ear around 2/12/2024.  She reports symptoms seem to gradually resolve over a few weeks after they spontaneously started.  She states they completely resolved for a couple weeks, but approximately 2 weeks ago she developed another episode of very loud left-sided tinnitus.  She states this happened as she was preparing to go to bed and lasted for about 15 minutes before they resolved.  She states the tinnitus has not recurred since then to the point that she almost canceled the appointment today.  She denies previous history of tinnitus.  She states the only medical change around that time was starting use of nasal saline spray to help for nasal dryness.  She denies any current/recent otalgia, changes in hearing, vertigo, ear fullness/pressure.  She denies any URI-like symptoms around the time that the tinnitus started.  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  She denies a history of recurrent ear infections, ear surgery/tubes.  She does report that her brother passed away suddenly around the time that the tinnitus started and she is questioning if stress was playing a factor in the tinnitus since it has significantly

## 2024-07-23 ENCOUNTER — HOSPITAL ENCOUNTER (OUTPATIENT)
Age: 69
Discharge: HOME | End: 2024-07-23
Payer: MEDICARE

## 2024-07-23 LAB
ALBUMIN SERPL BCG-MCNC: 4.4 G/DL (ref 3.5–5.1)
ALP SERPL-CCNC: 111 U/L (ref 38–126)
ALT SERPL W/O P-5'-P-CCNC: 20 U/L (ref 11–66)
ANION GAP SERPL CALC-SCNC: 15 MEQ/L (ref 8–16)
AST SERPL-CCNC: 23 U/L (ref 5–40)
BILIRUB SERPL-MCNC: 0.5 MG/DL (ref 0.3–1.2)
BUN SERPL-MCNC: 15 MG/DL (ref 7–22)
CALCIUM SERPL-MCNC: 9.5 MG/DL (ref 8.5–10.5)
CHLORIDE SERPL-SCNC: 101 MEQ/L (ref 98–111)
CHOLEST SERPL-MCNC: 152 MG/DL (ref 100–199)
CO2 SERPL-SCNC: 24 MEQ/L (ref 23–33)
CREAT SERPL-MCNC: 0.7 MG/DL (ref 0.4–1.2)
DEPRECATED MEAN GLUCOSE BLD GHB EST-ACNC: 129 MG/DL (ref 70–126)
DEPRECATED RDW RBC AUTO: 45.9 FL (ref 35–45)
ERYTHROCYTE [DISTWIDTH] IN BLOOD BY AUTOMATED COUNT: 13.4 % (ref 11.5–14.5)
GFR SERPL CREATININE-BSD FRML MDRD: > 90 ML/MIN/1.73M2
GLUCOSE SERPL-MCNC: 127 MG/DL (ref 70–108)
HBA1C MFR BLD HPLC: 6.3 % (ref 4.4–6.4)
HCT VFR BLD AUTO: 40.8 % (ref 37–47)
HDLC SERPL-MCNC: 45 MG/DL
HGB BLD-MCNC: 13.4 GM/DL (ref 12–16)
LDLC SERPL CALC-MCNC: 75 MG/DL
MCH RBC QN AUTO: 30.3 PG (ref 26–33)
MCHC RBC AUTO-ENTMCNC: 32.8 GM/DL (ref 32.2–35.5)
MCV RBC AUTO: 92.3 FL (ref 81–99)
PLATELET # BLD AUTO: 348 THOU/MM3 (ref 130–400)
PMV BLD AUTO: 10.1 FL (ref 9.4–12.4)
POTASSIUM SERPL-SCNC: 4.1 MEQ/L (ref 3.5–5.2)
PROT SERPL-MCNC: 7.7 G/DL (ref 6.1–8)
RBC # BLD AUTO: 4.42 MILL/MM3 (ref 4.2–5.4)
SODIUM SERPL-SCNC: 140 MEQ/L (ref 135–145)
TRIGL SERPL-MCNC: 161 MG/DL (ref 0–199)
WBC # BLD AUTO: 11 THOU/MM3 (ref 4.8–10.8)

## 2024-07-23 PROCEDURE — 83036 HEMOGLOBIN GLYCOSYLATED A1C: CPT

## 2024-07-23 PROCEDURE — 85027 COMPLETE CBC AUTOMATED: CPT

## 2024-07-23 PROCEDURE — 80061 LIPID PANEL: CPT

## 2024-07-23 PROCEDURE — 36415 COLL VENOUS BLD VENIPUNCTURE: CPT

## 2024-07-23 PROCEDURE — 80053 COMPREHEN METABOLIC PANEL: CPT

## 2024-07-30 SDOH — ECONOMIC STABILITY: INCOME INSECURITY: HOW HARD IS IT FOR YOU TO PAY FOR THE VERY BASICS LIKE FOOD, HOUSING, MEDICAL CARE, AND HEATING?: NOT HARD AT ALL

## 2024-07-30 SDOH — ECONOMIC STABILITY: FOOD INSECURITY: WITHIN THE PAST 12 MONTHS, YOU WORRIED THAT YOUR FOOD WOULD RUN OUT BEFORE YOU GOT MONEY TO BUY MORE.: NEVER TRUE

## 2024-07-30 SDOH — ECONOMIC STABILITY: FOOD INSECURITY: WITHIN THE PAST 12 MONTHS, THE FOOD YOU BOUGHT JUST DIDN'T LAST AND YOU DIDN'T HAVE MONEY TO GET MORE.: NEVER TRUE

## 2024-08-01 ENCOUNTER — OFFICE VISIT (OUTPATIENT)
Dept: FAMILY MEDICINE CLINIC | Age: 69
End: 2024-08-01
Payer: MEDICARE

## 2024-08-01 VITALS
HEART RATE: 82 BPM | HEIGHT: 65 IN | DIASTOLIC BLOOD PRESSURE: 72 MMHG | SYSTOLIC BLOOD PRESSURE: 136 MMHG | OXYGEN SATURATION: 96 % | WEIGHT: 198 LBS | RESPIRATION RATE: 19 BRPM | BODY MASS INDEX: 32.99 KG/M2

## 2024-08-01 DIAGNOSIS — E78.00 PURE HYPERCHOLESTEROLEMIA: ICD-10-CM

## 2024-08-01 DIAGNOSIS — I10 ESSENTIAL HYPERTENSION: ICD-10-CM

## 2024-08-01 DIAGNOSIS — Z12.31 BREAST CANCER SCREENING BY MAMMOGRAM: ICD-10-CM

## 2024-08-01 DIAGNOSIS — E11.9 TYPE 2 DIABETES MELLITUS WITHOUT COMPLICATION, WITHOUT LONG-TERM CURRENT USE OF INSULIN (HCC): Primary | ICD-10-CM

## 2024-08-01 DIAGNOSIS — Z78.0 POST-MENOPAUSE: ICD-10-CM

## 2024-08-01 DIAGNOSIS — N39.3 STRESS INCONTINENCE: ICD-10-CM

## 2024-08-01 PROCEDURE — 99214 OFFICE O/P EST MOD 30 MIN: CPT | Performed by: FAMILY MEDICINE

## 2024-08-01 PROCEDURE — 3044F HG A1C LEVEL LT 7.0%: CPT | Performed by: FAMILY MEDICINE

## 2024-08-01 PROCEDURE — 3075F SYST BP GE 130 - 139MM HG: CPT | Performed by: FAMILY MEDICINE

## 2024-08-01 PROCEDURE — 3078F DIAST BP <80 MM HG: CPT | Performed by: FAMILY MEDICINE

## 2024-08-01 PROCEDURE — 1123F ACP DISCUSS/DSCN MKR DOCD: CPT | Performed by: FAMILY MEDICINE

## 2024-08-01 RX ORDER — AMOXICILLIN 500 MG/1
CAPSULE ORAL
COMMUNITY
Start: 2024-07-25

## 2024-08-01 RX ORDER — OXYBUTYNIN CHLORIDE 10 MG/1
10 TABLET, EXTENDED RELEASE ORAL DAILY
Qty: 90 TABLET | Refills: 3 | Status: SHIPPED | OUTPATIENT
Start: 2024-08-01

## 2024-08-01 RX ORDER — IRBESARTAN AND HYDROCHLOROTHIAZIDE 300; 12.5 MG/1; MG/1
1 TABLET, FILM COATED ORAL DAILY
Qty: 90 TABLET | Refills: 3 | Status: SHIPPED | OUTPATIENT
Start: 2024-08-01

## 2024-08-01 ASSESSMENT — ENCOUNTER SYMPTOMS
SINUS PRESSURE: 0
SINUS PAIN: 0
CONSTIPATION: 0
NAUSEA: 0
VOMITING: 0
DIARRHEA: 0

## 2024-08-01 NOTE — PROGRESS NOTES
[facility] PROFESSIONAL SERVS  [facility] MEDICINE  [address]  [facility] [address]  Dept: [phone]  Dept Fax: [phone]  Loc: [phone]  Resident Note    Patient:Lisa Miguel Sex: female  YOB: 1955 Age:69 y.o.  MRN: [id number]  Assessment & Plan   1. Type 2 diabetes mellitus without complication, without long-term current use of insulin (HCC)  - Chronic, well controlled with lifestyle modifications  - 7/23/24 HgbA1C of 6.3  - Foot exam completed in office today as detailed below - 7/7 sites sensed, 2+ palpable Dorsalis Pedis and Posterior Tibial pulses, and skin without ulcers, breakdown, and callous  - Checking Microalbumin/Creatinine Urine Ratio to be completed at lab  - Ordering repeat labwork (CBC, CMP, HgbA1C, Lipid Panel) to be completed prior to next appointment in 6 months  Orders  - Microalbumin / Creatinine Urine Ratio; Future  -  DIABETES FOOT EXAM  - CBC; Future  - Comprehensive Metabolic Panel; Future  - Hemoglobin A1C; Future  - Lipid Panel; Future    2. Essential hypertension  - /72 in office, comparable to home blood pressure readings  - Checking Microalbumin/Creatinine Urine Ratio to be completed at lab  - Refilling Avalide 300-12.5mg daily  Orders  - Microalbumin / Creatinine Urine Ratio; Future  - irbesartan-hydroCHLOROthiazide (AVALIDE) 300-12.5 MG per tablet; Take 1 tablet by mouth daily  Dispense: 90 tablet; Refill: 3    3. Pure hypercholesterolemia  - 7/23/24 Lipid Panel with LDL 75  - Continue Atorvastatin 80mg daily  Orders  - Lipid Panel; Future    4. Post-menopause  Orders  - DEXA BONE DENSITY AXIAL SKELETON; Future    5. Stress incontinence  Orders  - oxyBUTYnin (DITROPAN-XL) 10 MG extended release tablet; Take 1 tablet by mouth daily  Dispense: 90 tablet; Refill: 3    6. Breast cancer screening by mammogram  Orders  - MARIANO DIGITAL SCREEN W OR WO CAD BILATERAL; Future     Health Maintenance/Vaccinations  - Labs

## 2024-08-01 NOTE — PROGRESS NOTES
Attending Supervising Physician's Attestation Statement  The patient is a 69 y.o. female. I have performed a history and physical examination of the patient. I discussed the case with the resident physician.    I reviewed the patient's Past Medical History, Past Surgical History, Medications, and Allergies.     Physical Exam:  Vitals:    08/01/24 1051   BP: 136/72   Pulse: 82   Resp: 19   SpO2: 96%   Weight: 89.8 kg (198 lb)   Height: 1.651 m (5' 5\")       Gen:  No distress.  Well developed and well nourished.  obese  Cardiac: Regular rate and rhythm.  No murmur  Pulmonary: Clear to auscultation bilaterally.  No wheezes, rales, or rhonchi.  No respiratory distress  Neurological: Alert.  Psych: Normal mood and affect.  Normal attention.  Normal behavior      Impression/Plan  I reviewed and agree with the findings and plan documented in her note .     Diagnosis Orders   1. Type 2 diabetes mellitus without complication, without long-term current use of insulin (HCC)  Microalbumin / Creatinine Urine Ratio    HM DIABETES FOOT EXAM    CBC    Comprehensive Metabolic Panel    Hemoglobin A1C    Lipid Panel      2. Essential hypertension  Microalbumin / Creatinine Urine Ratio    irbesartan-hydroCHLOROthiazide (AVALIDE) 300-12.5 MG per tablet      3. Pure hypercholesterolemia  Lipid Panel      4. Post-menopause  DEXA BONE DENSITY AXIAL SKELETON      5. Stress incontinence  oxyBUTYnin (DITROPAN-XL) 10 MG extended release tablet      6. Breast cancer screening by mammogram  MARIANO DIGITAL SCREEN W OR WO CAD BILATERAL        Type 2 diabetes: Chronic.  Controlled with A1c of 6.3%.  Controlled with lifestyle modifications    Hypertension: Chronic.  Controlled.  Continue Avalide    Hypercholesterolemia: Chronic.  Controlled with LDL of 75.  Continue Lipitor    Reviewed recent labs  Check labs in 6-month    Electronically signed by Raphael Andujar MD on 8/1/24 at 11:13 AM EDT

## 2024-09-18 ENCOUNTER — TELEPHONE (OUTPATIENT)
Dept: ENT CLINIC | Age: 69
End: 2024-09-18

## 2025-01-23 ENCOUNTER — HOSPITAL ENCOUNTER (OUTPATIENT)
Age: 70
Discharge: HOME | End: 2025-01-23
Payer: MEDICARE

## 2025-01-23 DIAGNOSIS — E78.00 PURE HYPERCHOLESTEROLEMIA: ICD-10-CM

## 2025-01-23 DIAGNOSIS — E11.9 TYPE 2 DIABETES MELLITUS WITHOUT COMPLICATION, WITHOUT LONG-TERM CURRENT USE OF INSULIN (HCC): ICD-10-CM

## 2025-01-23 LAB
ALBUMIN SERPL BCG-MCNC: 4.1 G/DL (ref 3.5–5.1)
ALP SERPL-CCNC: 102 U/L (ref 38–126)
ALT SERPL W/O P-5'-P-CCNC: 28 U/L (ref 11–66)
ANION GAP SERPL CALC-SCNC: 8 MEQ/L (ref 8–16)
AST SERPL-CCNC: 24 U/L (ref 5–40)
BILIRUB SERPL-MCNC: 0.4 MG/DL (ref 0.3–1.2)
BUN SERPL-MCNC: 13 MG/DL (ref 7–22)
CALCIUM SERPL-MCNC: 9.6 MG/DL (ref 8.5–10.5)
CHLORIDE SERPL-SCNC: 101 MEQ/L (ref 98–111)
CHOLEST SERPL-MCNC: 174 MG/DL (ref 100–199)
CO2 SERPL-SCNC: 28 MEQ/L (ref 23–33)
CREAT SERPL-MCNC: 0.8 MG/DL (ref 0.4–1.2)
DEPRECATED MEAN GLUCOSE BLD GHB EST-ACNC: 135 MG/DL (ref 70–126)
DEPRECATED RDW RBC AUTO: 44.3 FL (ref 35–45)
ERYTHROCYTE [DISTWIDTH] IN BLOOD BY AUTOMATED COUNT: 13.5 % (ref 11.5–14.5)
GFR SERPL CREATININE-BSD FRML MDRD: 80 ML/MIN/1.73M2
GLUCOSE SERPL-MCNC: 117 MG/DL (ref 70–108)
HBA1C MFR BLD HPLC: 6.5 % (ref 4.4–6.4)
HCT VFR BLD AUTO: 40.9 % (ref 37–47)
HDLC SERPL-MCNC: 41 MG/DL
HGB BLD-MCNC: 13.5 GM/DL (ref 12–16)
LDLC SERPL CALC-MCNC: 83 MG/DL
MCH RBC QN AUTO: 29.6 PG (ref 26–33)
MCHC RBC AUTO-ENTMCNC: 33 GM/DL (ref 32.2–35.5)
MCV RBC AUTO: 89.7 FL (ref 81–99)
PLATELET # BLD AUTO: 311 THOU/MM3 (ref 130–400)
PMV BLD AUTO: 9.6 FL (ref 9.4–12.4)
POTASSIUM SERPL-SCNC: 4.4 MEQ/L (ref 3.5–5.2)
PROT SERPL-MCNC: 7.5 G/DL (ref 6.1–8)
RBC # BLD AUTO: 4.56 MILL/MM3 (ref 4.2–5.4)
SODIUM SERPL-SCNC: 137 MEQ/L (ref 135–145)
TRIGL SERPL-MCNC: 248 MG/DL (ref 0–199)
WBC # BLD AUTO: 8.5 THOU/MM3 (ref 4.8–10.8)

## 2025-01-23 PROCEDURE — 36415 COLL VENOUS BLD VENIPUNCTURE: CPT

## 2025-01-23 PROCEDURE — 80061 LIPID PANEL: CPT

## 2025-01-23 PROCEDURE — 85027 COMPLETE CBC AUTOMATED: CPT

## 2025-01-23 PROCEDURE — 80053 COMPREHEN METABOLIC PANEL: CPT

## 2025-01-23 PROCEDURE — 83036 HEMOGLOBIN GLYCOSYLATED A1C: CPT

## 2025-01-30 ENCOUNTER — OFFICE VISIT (OUTPATIENT)
Dept: FAMILY MEDICINE CLINIC | Age: 70
End: 2025-01-30
Payer: MEDICARE

## 2025-01-30 VITALS
HEIGHT: 65 IN | RESPIRATION RATE: 18 BRPM | SYSTOLIC BLOOD PRESSURE: 136 MMHG | OXYGEN SATURATION: 96 % | DIASTOLIC BLOOD PRESSURE: 82 MMHG | HEART RATE: 68 BPM | BODY MASS INDEX: 33.26 KG/M2 | WEIGHT: 199.6 LBS

## 2025-01-30 DIAGNOSIS — Z23 INFLUENZA VACCINE NEEDED: ICD-10-CM

## 2025-01-30 DIAGNOSIS — E11.9 TYPE 2 DIABETES MELLITUS WITHOUT COMPLICATION, WITHOUT LONG-TERM CURRENT USE OF INSULIN (HCC): ICD-10-CM

## 2025-01-30 DIAGNOSIS — E78.00 PURE HYPERCHOLESTEROLEMIA: ICD-10-CM

## 2025-01-30 DIAGNOSIS — Z00.00 MEDICARE ANNUAL WELLNESS VISIT, SUBSEQUENT: Primary | ICD-10-CM

## 2025-01-30 PROCEDURE — 1160F RVW MEDS BY RX/DR IN RCRD: CPT | Performed by: FAMILY MEDICINE

## 2025-01-30 PROCEDURE — G0439 PPPS, SUBSEQ VISIT: HCPCS | Performed by: FAMILY MEDICINE

## 2025-01-30 PROCEDURE — 90653 IIV ADJUVANT VACCINE IM: CPT | Performed by: FAMILY MEDICINE

## 2025-01-30 PROCEDURE — 1123F ACP DISCUSS/DSCN MKR DOCD: CPT | Performed by: FAMILY MEDICINE

## 2025-01-30 PROCEDURE — 3075F SYST BP GE 130 - 139MM HG: CPT | Performed by: FAMILY MEDICINE

## 2025-01-30 PROCEDURE — 1159F MED LIST DOCD IN RCRD: CPT | Performed by: FAMILY MEDICINE

## 2025-01-30 PROCEDURE — 3079F DIAST BP 80-89 MM HG: CPT | Performed by: FAMILY MEDICINE

## 2025-01-30 PROCEDURE — 3044F HG A1C LEVEL LT 7.0%: CPT | Performed by: FAMILY MEDICINE

## 2025-01-30 PROCEDURE — G0008 ADMIN INFLUENZA VIRUS VAC: HCPCS | Performed by: FAMILY MEDICINE

## 2025-01-30 RX ORDER — ATORVASTATIN CALCIUM 80 MG/1
80 TABLET, FILM COATED ORAL DAILY
Qty: 90 TABLET | Refills: 3 | Status: SHIPPED | OUTPATIENT
Start: 2025-01-30

## 2025-01-30 SDOH — ECONOMIC STABILITY: FOOD INSECURITY: WITHIN THE PAST 12 MONTHS, YOU WORRIED THAT YOUR FOOD WOULD RUN OUT BEFORE YOU GOT MONEY TO BUY MORE.: NEVER TRUE

## 2025-01-30 SDOH — ECONOMIC STABILITY: FOOD INSECURITY: WITHIN THE PAST 12 MONTHS, THE FOOD YOU BOUGHT JUST DIDN'T LAST AND YOU DIDN'T HAVE MONEY TO GET MORE.: NEVER TRUE

## 2025-01-30 ASSESSMENT — PATIENT HEALTH QUESTIONNAIRE - PHQ9
SUM OF ALL RESPONSES TO PHQ QUESTIONS 1-9: 0
SUM OF ALL RESPONSES TO PHQ9 QUESTIONS 1 & 2: 0
SUM OF ALL RESPONSES TO PHQ QUESTIONS 1-9: 0
2. FEELING DOWN, DEPRESSED OR HOPELESS: NOT AT ALL
1. LITTLE INTEREST OR PLEASURE IN DOING THINGS: NOT AT ALL

## 2025-01-30 NOTE — PROGRESS NOTES
Medicare Annual Wellness Visit    Lisa Miguel is here for Medicare AWV    Assessment & Plan   Medicare annual wellness visit, subsequent  Type 2 diabetes mellitus without complication, without long-term current use of insulin (HCC)  -     Albumin/Creatinine Ratio, Urine; Future  -     Comprehensive Metabolic Panel; Future  -     Hemoglobin A1C; Future  -     Lipid Panel; Future  Pure hypercholesterolemia  -     atorvastatin (LIPITOR) 80 MG tablet; Take 1 tablet by mouth daily, Disp-90 tablet, R-3Normal  -     Lipid Panel; Future  Influenza vaccine needed  -     Influenza, FLUAD Trivalent, (age 65 y+), IM, Preservative Free, 0.5mL       Type 2 diabetes mellitus: Chronic.  Controlled with lifestyle modifications.  Check labs in 6 months      Has orders for dexa and mammogram  Needs to do urine protein test    Return in about 6 months (around 7/30/2025) for DM.     Subjective       DM:  glucose 110-140s.  Checks glucose weekly. A1c 6.5% last week.  No DM meds    Started fish oil.  On lipitor.  LDL 83    Patient's complete Health Risk Assessment and screening values have been reviewed and are found in Flowsheets. The following problems were reviewed today and where indicated follow up appointments were made and/or referrals ordered.    Positive Risk Factor Screenings with Interventions:             General HRA Questions:  Select all that apply: (!) Stress    Interventions - Stress:  Patient comments: mother-in-law has medical problems and this leads to stress      Inactivity:  On average, how many days per week do you engage in moderate to strenuous exercise (like a brisk walk)?: 2 days (!) Abnormal  On average, how many minutes do you engage in exercise at this level?: 30 min    Interventions:  Recommendations: patient agrees to increase physical activity as follows: plans to walk more     Abnormal BMI (obese):  Body mass index is 33.22 kg/m². (!) Abnormal    Interventions:  Patient declines any further

## 2025-01-30 NOTE — PROGRESS NOTES
After obtaining consent, and per orders of Dr. Andujar, injection given by Viola Camarillo MA. Patient instructed to report any adverse reaction to me immediately.    Immunizations Administered       Name Date Dose Route    Influenza, FLUAD, (age 65 y+), IM, Trivalent PF, 0.5mL 1/30/2025 0.5 mL Intramuscular    Site: Deltoid- Left    Lot: [id number]    NDC: 02932-802-64            Patient tolerated well  VIS given  Vaccine Checklist filled out

## 2025-07-20 ENCOUNTER — HOSPITAL ENCOUNTER (OUTPATIENT)
Age: 70
Discharge: HOME | End: 2025-07-20
Payer: MEDICARE

## 2025-07-20 ENCOUNTER — HOSPITAL ENCOUNTER (EMERGENCY)
Age: 70
Discharge: HOME | End: 2025-07-20
Payer: MEDICARE

## 2025-07-20 VITALS
DIASTOLIC BLOOD PRESSURE: 86 MMHG | OXYGEN SATURATION: 97 % | TEMPERATURE: 98 F | BODY MASS INDEX: 31.62 KG/M2 | WEIGHT: 190 LBS | RESPIRATION RATE: 18 BRPM | HEART RATE: 74 BPM | SYSTOLIC BLOOD PRESSURE: 154 MMHG

## 2025-07-20 DIAGNOSIS — H69.92 DYSFUNCTION OF LEFT EUSTACHIAN TUBE: Primary | ICD-10-CM

## 2025-07-20 DIAGNOSIS — E78.00 PURE HYPERCHOLESTEROLEMIA: ICD-10-CM

## 2025-07-20 DIAGNOSIS — E11.9 TYPE 2 DIABETES MELLITUS WITHOUT COMPLICATION, WITHOUT LONG-TERM CURRENT USE OF INSULIN (HCC): ICD-10-CM

## 2025-07-20 LAB
ALBUMIN SERPL BCG-MCNC: 4.2 G/DL (ref 3.4–4.9)
ALP SERPL-CCNC: 106 U/L (ref 38–126)
ALT SERPL W/O P-5'-P-CCNC: 31 U/L (ref 10–35)
ANION GAP SERPL CALC-SCNC: 11 MEQ/L (ref 8–16)
AST SERPL-CCNC: 25 U/L (ref 10–35)
BILIRUB SERPL-MCNC: 0.6 MG/DL (ref 0.3–1.2)
BUN SERPL-MCNC: 13 MG/DL (ref 8–23)
CALCIUM SERPL-MCNC: 9.7 MG/DL (ref 8.8–10.2)
CHLORIDE SERPL-SCNC: 102 MEQ/L (ref 98–111)
CHOLEST SERPL-MCNC: 176 MG/DL (ref 100–199)
CO2 SERPL-SCNC: 27 MEQ/L (ref 22–29)
CREAT SERPL-MCNC: 0.9 MG/DL (ref 0.5–0.9)
DEPRECATED MEAN GLUCOSE BLD GHB EST-ACNC: 138 MG/DL (ref 70–126)
GFR SERPL CREATININE-BSD FRML MDRD: 70 ML/MIN/1.73M2
GLUCOSE SERPL-MCNC: 132 MG/DL (ref 74–109)
HBA1C MFR BLD HPLC: 6.6 % (ref 4–6)
HDLC SERPL-MCNC: 40 MG/DL
LDLC SERPL CALC-MCNC: 75 MG/DL
POTASSIUM SERPL-SCNC: 4.6 MEQ/L (ref 3.5–5.2)
PROT SERPL-MCNC: 7.8 G/DL (ref 6.4–8.3)
SODIUM SERPL-SCNC: 140 MEQ/L (ref 135–145)
TRIGL SERPL-MCNC: 307 MG/DL (ref 0–199)

## 2025-07-20 PROCEDURE — 99213 OFFICE O/P EST LOW 20 MIN: CPT

## 2025-07-20 PROCEDURE — 83036 HEMOGLOBIN GLYCOSYLATED A1C: CPT

## 2025-07-20 PROCEDURE — 80053 COMPREHEN METABOLIC PANEL: CPT

## 2025-07-20 PROCEDURE — 80061 LIPID PANEL: CPT

## 2025-07-20 PROCEDURE — 36415 COLL VENOUS BLD VENIPUNCTURE: CPT

## 2025-07-20 ASSESSMENT — PAIN DESCRIPTION - LOCATION: LOCATION: EAR

## 2025-07-20 ASSESSMENT — PAIN SCALES - GENERAL: PAINLEVEL_OUTOF10: 5

## 2025-07-20 ASSESSMENT — PAIN DESCRIPTION - DESCRIPTORS: DESCRIPTORS: ACHING

## 2025-07-20 ASSESSMENT — PAIN DESCRIPTION - FREQUENCY: FREQUENCY: CONTINUOUS

## 2025-07-20 ASSESSMENT — PAIN DESCRIPTION - ORIENTATION: ORIENTATION: LEFT

## 2025-07-20 ASSESSMENT — PAIN - FUNCTIONAL ASSESSMENT: PAIN_FUNCTIONAL_ASSESSMENT: 0-10

## 2025-07-20 ASSESSMENT — PAIN DESCRIPTION - PAIN TYPE: TYPE: ACUTE PAIN

## 2025-07-20 NOTE — ED TRIAGE NOTES
Patient to room with c/o left ear pain beginning yesterday. Denies head congestion or sinus pressure.

## 2025-07-20 NOTE — DISCHARGE INSTRUCTIONS
Can take over-the-counter Zyrtec daily.  Can take over-the-counter Coricidin HBP for congestion.  Follow-up with family doctor in 3 days.  Go to the emergency room for any fever, new or worsening symptoms.

## 2025-07-20 NOTE — ED PROVIDER NOTES
[facility] URGENT CARE  Urgent Care Encounter       CHIEF COMPLAINT       Chief Complaint   Patient presents with    Ear Pain     Left         Nurses Notes reviewed and I agree except as noted in the HPI.  HISTORY OF PRESENT ILLNESS   Lisa Miguel is a 70 y.o. female who presents to urgent care for left ear pain.  Symptoms started 1 day ago.  Denies over-the-counter medications.  Denies fever, cough, congestion and seasonal allergies.  States she does swim but she does not put her head underwater.    The history is provided by the patient. No  was used.       REVIEW OF SYSTEMS     Review of Systems   Constitutional: Negative.  Negative for fever.   HENT:  Positive for ear pain (left). Negative for congestion.    Eyes: Negative.    Respiratory:  Negative for cough.    Cardiovascular: Negative.    Gastrointestinal: Negative.    Endocrine: Negative.    Genitourinary: Negative.    Musculoskeletal: Negative.    Skin: Negative.    Allergic/Immunologic: Negative for environmental allergies.   Neurological: Negative.    Hematological: Negative.    Psychiatric/Behavioral: Negative.         PAST MEDICAL HISTORY         Diagnosis Date    Depression     Hyperlipidemia     Hypertension     Type 2 diabetes mellitus (HCC)     diet controlled       SURGICALHISTORY     Patient  has a past surgical history that includes Cholecystectomy; Knee arthroscopy (Left, 08/2018); Total knee arthroplasty (Left, 02/2020); Carpal tunnel release; Total knee arthroplasty (Right, 03/2021); and Colonoscopy (Left, 05/26/2021).    CURRENT MEDICATIONS       Previous Medications    ACETAMINOPHEN (TYLENOL) 325 MG TABLET    Take 2 tablets by mouth every 6 hours as needed for Pain    ATORVASTATIN (LIPITOR) 80 MG TABLET    Take 1 tablet by mouth daily    BLOOD GLUCOSE MONITORING SUPPL MISC    Dispense one monitor for associated glucometer.    BLOOD GLUCOSE TEST STRIPS (TRUE METRIX BLOOD GLUCOSE TEST) STRIP    TEST glucose once

## 2025-07-23 ENCOUNTER — OFFICE VISIT (OUTPATIENT)
Dept: FAMILY MEDICINE CLINIC | Age: 70
End: 2025-07-23
Payer: MEDICARE

## 2025-07-23 VITALS
TEMPERATURE: 97.9 F | RESPIRATION RATE: 17 BRPM | HEART RATE: 71 BPM | DIASTOLIC BLOOD PRESSURE: 76 MMHG | BODY MASS INDEX: 33.52 KG/M2 | SYSTOLIC BLOOD PRESSURE: 156 MMHG | WEIGHT: 201.2 LBS | OXYGEN SATURATION: 95 % | HEIGHT: 65 IN

## 2025-07-23 DIAGNOSIS — B02.9 HERPES ZOSTER WITHOUT COMPLICATION: ICD-10-CM

## 2025-07-23 DIAGNOSIS — H60.502 ACUTE OTITIS EXTERNA OF LEFT EAR, UNSPECIFIED TYPE: Primary | ICD-10-CM

## 2025-07-23 PROCEDURE — 3078F DIAST BP <80 MM HG: CPT | Performed by: NURSE PRACTITIONER

## 2025-07-23 PROCEDURE — 1123F ACP DISCUSS/DSCN MKR DOCD: CPT | Performed by: NURSE PRACTITIONER

## 2025-07-23 PROCEDURE — 1159F MED LIST DOCD IN RCRD: CPT | Performed by: NURSE PRACTITIONER

## 2025-07-23 PROCEDURE — 99213 OFFICE O/P EST LOW 20 MIN: CPT | Performed by: NURSE PRACTITIONER

## 2025-07-23 PROCEDURE — 3077F SYST BP >= 140 MM HG: CPT | Performed by: NURSE PRACTITIONER

## 2025-07-23 RX ORDER — NEOMYCIN SULFATE, POLYMYXIN B SULFATE AND HYDROCORTISONE 3.5; 10000; 1 MG/ML; [IU]/ML; MG/ML
4 SOLUTION AURICULAR (OTIC) 3 TIMES DAILY
Qty: 10 ML | Refills: 0 | Status: SHIPPED | OUTPATIENT
Start: 2025-07-23 | End: 2025-08-02

## 2025-07-23 RX ORDER — VALACYCLOVIR HYDROCHLORIDE 1 G/1
1000 TABLET, FILM COATED ORAL 3 TIMES DAILY
Qty: 21 TABLET | Refills: 0 | Status: SHIPPED | OUTPATIENT
Start: 2025-07-23 | End: 2025-07-30

## 2025-07-23 NOTE — PROGRESS NOTES
[facility] PROFESSIONAL [facility]  [address]  [facility] [address]  Dept: [phone]  Dept Fax: [phone]  Loc: [phone]     Visit Date:  7/23/2025    Provider: DARIUSZ Gary - CNP        Patient:  Lisa Miguel  YOB: 1955    HPI:     Chief Complaint   Patient presents with    Ear Pain     Left ear pain started Saturday. Went to Ambulatory Sunday, did not give her anything and says that no ear infection.   Pt says the ear felt wet in side.   No N/V/D       History of Present Illness  The patient is a 70-year-old female who presents for left ear pain.    She sought medical attention at an urgent care facility where she was diagnosed with eustachian tube dysfunction and prescribed allergy medication. The treatment has not alleviated her ear pain, and she now experiences a sensation of swelling in her ear. She reports no systemic symptoms such as fever or chills. This morning, she noticed a slight dampness in her ear, which she attributes to perspiration. She has not experienced any hearing loss. She describes the pain as a sharp, stabbing sensation. She also reports a mild headache, which she believes may be due to nerve irritation from massaging the area. She does not report any facial pain but notes some swelling on one side of her face. She has no history of shingles. She has an upcoming appointment with her primary care physician next Tuesday for routine checkups.         Medications    Current Outpatient Medications:     neomycin-polymyxin-hydrocortisone (CORTISPORIN) 3.5-43360-8 otic solution, Place 4 drops into the left ear 3 times daily for 10 days, Disp: 10 mL, Rfl: 0    valACYclovir (VALTREX) 1 g tablet, Take 1 tablet by mouth 3 times daily for 7 days, Disp: 21 tablet, Rfl: 0    atorvastatin (LIPITOR) 80 MG tablet, Take 1 tablet by mouth daily, Disp: 90 tablet, Rfl: 3    irbesartan-hydroCHLOROthiazide (AVALIDE)

## 2025-07-24 ENCOUNTER — HOSPITAL ENCOUNTER (EMERGENCY)
Age: 70
Discharge: HOME | End: 2025-07-24
Attending: EMERGENCY MEDICINE
Payer: MEDICARE

## 2025-07-24 VITALS
SYSTOLIC BLOOD PRESSURE: 186 MMHG | HEART RATE: 71 BPM | RESPIRATION RATE: 17 BRPM | DIASTOLIC BLOOD PRESSURE: 77 MMHG | TEMPERATURE: 98.1 F | OXYGEN SATURATION: 96 % | WEIGHT: 200 LBS | BODY MASS INDEX: 33.28 KG/M2

## 2025-07-24 DIAGNOSIS — B02.8 HERPES ZOSTER WITH OTHER COMPLICATION: ICD-10-CM

## 2025-07-24 DIAGNOSIS — G51.0 BELL'S PALSY: Primary | ICD-10-CM

## 2025-07-24 LAB
EKG ATRIAL RATE: 81 BPM
EKG P AXIS: 5 DEGREES
EKG P-R INTERVAL: 136 MS
EKG Q-T INTERVAL: 344 MS
EKG QRS DURATION: 96 MS
EKG QTC CALCULATION (BAZETT): 399 MS
EKG R AXIS: 20 DEGREES
EKG T AXIS: -6 DEGREES
EKG VENTRICULAR RATE: 81 BPM
GLUCOSE BLD STRIP.AUTO-MCNC: 124 MG/DL (ref 70–108)

## 2025-07-24 PROCEDURE — 93005 ELECTROCARDIOGRAM TRACING: CPT | Performed by: EMERGENCY MEDICINE

## 2025-07-24 PROCEDURE — 99283 EMERGENCY DEPT VISIT LOW MDM: CPT

## 2025-07-24 PROCEDURE — 82948 REAGENT STRIP/BLOOD GLUCOSE: CPT

## 2025-07-24 PROCEDURE — 99215 OFFICE O/P EST HI 40 MIN: CPT

## 2025-07-24 PROCEDURE — 93010 ELECTROCARDIOGRAM REPORT: CPT | Performed by: INTERNAL MEDICINE

## 2025-07-24 RX ORDER — MINERAL OIL AND PETROLATUM 150; 830 MG/G; MG/G
OINTMENT OPHTHALMIC
Qty: 3.5 G | Refills: 0 | Status: SHIPPED | OUTPATIENT
Start: 2025-07-24

## 2025-07-24 RX ORDER — PREDNISONE 20 MG/1
40 TABLET ORAL 2 TIMES DAILY
Qty: 28 TABLET | Refills: 0 | Status: SHIPPED | OUTPATIENT
Start: 2025-07-24 | End: 2025-07-31

## 2025-07-24 RX ORDER — DIPHENHYDRAMINE HCL 25 MG
1 CAPSULE ORAL EVERY 4 HOURS PRN
Qty: 15 ML | Refills: 0 | Status: SHIPPED | OUTPATIENT
Start: 2025-07-24

## 2025-07-24 ASSESSMENT — PAIN SCALES - GENERAL: PAINLEVEL_OUTOF10: 3

## 2025-07-24 ASSESSMENT — PAIN DESCRIPTION - PAIN TYPE: TYPE: ACUTE PAIN

## 2025-07-24 ASSESSMENT — PAIN DESCRIPTION - LOCATION: LOCATION: NECK

## 2025-07-24 ASSESSMENT — PAIN - FUNCTIONAL ASSESSMENT: PAIN_FUNCTIONAL_ASSESSMENT: 0-10

## 2025-07-24 ASSESSMENT — ENCOUNTER SYMPTOMS: SHORTNESS OF BREATH: 0

## 2025-07-24 NOTE — ED NOTES
Pt to ED for eval of left sided facial droop. Dr. Camacho to bedside. Code stroke deferred at this time per Dr. Camacho. Pt in stable condition

## 2025-07-24 NOTE — ED PROVIDER NOTES
[facility] EMERGENCY DEPARTMENT  Urgent Care Encounter      CHIEF COMPLAINT       Chief Complaint   Patient presents with    Facial Droop       Nurses Notes reviewed and I agree except as noted in the HPI.  HISTORY OF PRESENT ILLNESS   Lisa Miguel is a 70 y.o. female who presents to urgent care with complaints of left sided facial droop and numbness. Patient reports symptoms started at 10am. Reports she is currently being treated for shingles with Valtrex.     REVIEW OF SYSTEMS     Review of Systems   HENT:  Negative for congestion.    Eyes:  Negative for visual disturbance.   Respiratory:  Negative for cough and shortness of breath.    Cardiovascular:  Negative for chest pain.   Neurological:  Positive for numbness. Negative for dizziness.       PAST MEDICAL HISTORY         Diagnosis Date    Depression     Hyperlipidemia     Hypertension     Type 2 diabetes mellitus (HCC)     diet controlled       SURGICAL HISTORY     Patient  has a past surgical history that includes Cholecystectomy; Knee arthroscopy (Left, 08/2018); Total knee arthroplasty (Left, 02/2020); Carpal tunnel release; Total knee arthroplasty (Right, 03/2021); and Colonoscopy (Left, 05/26/2021).    CURRENT MEDICATIONS       Previous Medications    ACETAMINOPHEN (TYLENOL) 325 MG TABLET    Take 2 tablets by mouth every 6 hours as needed for Pain    ATORVASTATIN (LIPITOR) 80 MG TABLET    Take 1 tablet by mouth daily    BLOOD GLUCOSE MONITORING SUPPL MISC    Dispense one monitor for associated glucometer.    BLOOD GLUCOSE TEST STRIPS (TRUE METRIX BLOOD GLUCOSE TEST) STRIP    TEST glucose once daily    CALCIUM PO    Take by mouth    IRBESARTAN-HYDROCHLOROTHIAZIDE (AVALIDE) 300-12.5 MG PER TABLET    Take 1 tablet by mouth daily    NEOMYCIN-POLYMYXIN-HYDROCORTISONE (CORTISPORIN) 3.5-37723-5 OTIC SOLUTION    Place 4 drops into the left ear 3 times daily for 10 days    NONFORMULARY    Occuvite vitamin    OMEGA-3 FATTY ACIDS (FISH OIL OMEGA-3 PO)

## 2025-07-24 NOTE — ED TRIAGE NOTES
Pt ambulatory to [facility] with c/o facial droop and numbness starting at 1000 this morning. Pt has been dealing with ear infection since Sunday. Pt reports waking \"normal\" this morning and then \"about mid-morning, my face started to become numb and I noticed the droop.\" Pt reporting 3/10 L sided neck pain, non-radiating. Pt has left sided facial droop and is unable to close L eye when blinking. Carmen NP at bedside for assessment. NIH=2. Pt also has tongue deviation to the L. Pt refusing EMS transport, requesting  to take pt to ED for further eval. Paperwork signed and pt ambulatory to private vehicle with steady gait with . No other concerns noted.

## 2025-07-24 NOTE — ED PROVIDER NOTES
[facility]  EMERGENCY MEDICINE ATTENDING ATTESTATION      Evaluation of Lisa Miguel.   Case discussed and care plan developed with resident physician.   I agree with the resident physician documentation and plan as documented by her, except if my documentation differs.   Patient seen, interviewed and examined by me with resident immediately upon patient's arrival.  I reviewed the medical, surgical, family and social history, medications and allergies.   I have reviewed and interpreted all available lab, radiology and ekg results available at the moment.  I have reviewed the nursing documentation.       Brief H&P   Patient c/o facial asymmetry since around 10 AM today.  Patient was seen by PCP 2 days ago diagnosed with zoster around her left ear and started on valacyclovir.    Physical exam is notable for well appearing, left-sided peripheral facial palsy.  Normal otoscopic exam.      Medical Decision Making   MDM:   Bell's palsy  Plan:   Will add steroids and artificial tears to the patient's current treatment  PCP follow-up    Please see the resident physician completed note for final disposition except as documented on this attestation.   I have reviewed and interpreted all available lab, radiology and ekg results available at the moment.  Diagnosis, treatment and disposition plans were discussed and agreed upon by patient.   This transcription was electronically signed. It was dictated by use of voice recognition software and electronically transcribed. The transcription may contain errors not detected in proofreading.     I performed direct supervision and was present for the critical portion following procedures: None  Critical care time on this case: None    Electronically signed by Asad Camacho MD on 7/24/25 at 1:37 PM EDT           Asad Camacho MD  07/24/25 [id number]

## 2025-07-24 NOTE — ED PROVIDER NOTES
[facility] EMERGENCY DEPARTMENT  EMERGENCY DEPARTMENT ENCOUNTER          Pt Name: Lisa Miguel  MRN: [id number]  Birthdate 1955  Date of evaluation: 7/24/2025  Physician: Carey Craft MD  Supervising Attending Physician: Asad Camacho MD       CHIEF COMPLAINT       Chief Complaint   Patient presents with    Facial Droop         HISTORY OF PRESENT ILLNESS    HPI  Lisa Miguel is a 70 y.o. female who presents to the emergency department for evaluation of left facial droop.  Patient states she initially presented to her family doctor for left ear pain for which she was prescribed an antiviral for possible shingles neuropathy. This morning patient at about 10 AM she noticed left facial droop, and had difficulty closing her left eye. Patient has unilateral facial palsy, loss of left forehead muscle tone and incomplete left eye closure without other neurological findings.The patient has no other acute complaints at this time.    PAST MEDICAL AND SURGICAL HISTORY     Past Medical History:   Diagnosis Date    Depression     Hyperlipidemia     Hypertension     Type 2 diabetes mellitus (HCC)     diet controlled     Past Surgical History:   Procedure Laterality Date    CARPAL TUNNEL RELEASE      CHOLECYSTECTOMY      H    COLONOSCOPY Left 05/26/2021    COLONOSCOPY performed by Venkata Ryan DO at [facility]    KNEE ARTHROSCOPY Left 08/2018    [facility]    TOTAL KNEE ARTHROPLASTY Left 02/2020    [facility]    TOTAL KNEE ARTHROPLASTY Right 03/2021         MEDICATIONS   No current facility-administered medications for this encounter.    Current Outpatient Medications:     neomycin-polymyxin-hydrocortisone (CORTISPORIN) 3.5-84515-1 otic solution, Place 4 drops into the left ear 3 times daily for 10 days, Disp: 10 mL, Rfl: 0    valACYclovir (VALTREX) 1 g tablet, Take 1 tablet by mouth 3 times daily for 7 days, Disp: 21 tablet, Rfl: 0    Omega-3 Fatty Acids (FISH OIL OMEGA-3 PO), Take by mouth

## 2025-07-25 ENCOUNTER — CARE COORDINATION (OUTPATIENT)
Dept: CARE COORDINATION | Age: 70
End: 2025-07-25

## 2025-07-25 NOTE — CARE COORDINATION
I agree with the Care Coordinator's Plan of Care    Yes, she may use a lymphatic brush massager    Please advise patient.  Raphael Andujar MD

## 2025-07-25 NOTE — CARE COORDINATION
Ambulatory Care Coordination Note     7/25/2025 9:08 AM     ACM outreach attempt by this ACM today to offer care management services. ACM was unable to reach the patient by telephone today;   left voice message requesting a return phone call to this ACM.  My chart letter     ACM: Mami Garza RN     Care Summary Note:   Referral for amb care mgmt from [facility]    PCP/Specialist follow up:   Future Appointments         Provider Specialty Dept Phone    7/30/2025 11:15 AM Raphael Andujar MD Family Medicine [phone]            Follow Up:   Plan for next ACM outreach in approximately 1 week to complete:  - outreach attempt to offer care management services.

## 2025-07-25 NOTE — CARE COORDINATION
Ambulatory Care Coordination Note     2025 1:19 PM     Patient Current Location:  Home: [address] [place], [place] [id number]     This patient was received as a referral from [facility] .    ACM contacted the patient by telephone. Verified name and  with patient as identifiers. Provided introduction to self, and explanation of the ACM role.   Patient accepted care management services at this time.          ACM: Mami Garza RN     Challenges to be reviewed by the provider   Additional needs identified to be addressed with provider Yes  plan               Method of communication with provider: chart routing.    Utilization: Initial Call - N/A    Care Summary Note:   Ambulatory care management referral from [facility]. Lives at home with . Independent with ADL's. Both she and  drive. Very active.   ED for left side facial droop. Dx with Bell's Palsy. Dealing with ear infection prior. Started prednisone and eye drops. Numbness is starting to improve. No choking or swallowing problems. She taped her eye shut last night. Does not have ointment eye drops. ACM spoke with [facility]. Ointment eye drops are OTC. [facility] does not have them but looks like [facility] does. Advised pt. Patient is asking if she can use a lymphatic brush massager on her face. Her daughter is an OT and researched Bell's Palsy. Will send note to PCP.   Advised pt per PCP can use lymphatic brush massager.     Plan -   Eye drops and ointment drops  Prednisone  PCP follow up next week  Early symptom recognition and reporting to prevent ED and admissions  Use acute care appts with PCP office for non-emergency problems    Offered patient enrollment in the Remote Patient Monitoring (RPM) program for in-home monitoring: Yes, but did not enroll at this time: controlled chronic disease management.     Assessments Completed:   Ambulatory Care Coordination Assessment    Care Coordination Protocol  Referral from Primary Care

## 2025-07-30 ENCOUNTER — TELEPHONE (OUTPATIENT)
Dept: FAMILY MEDICINE CLINIC | Age: 70
End: 2025-07-30

## 2025-07-30 NOTE — TELEPHONE ENCOUNTER
Patient had shown up 40 minutes late for her appointment.     Patient was rescheduled to Friday with Brianna.     She is wanting to know if she is able to do her garage sale this weekend. Still having some numbness and facial droop from bells palsy. Speech is fine and intact.     Antiviral for Shingles is finished after today and she is wanting to make sure she can host her garage sale this weekend.     Please advise.

## 2025-08-01 ENCOUNTER — CARE COORDINATION (OUTPATIENT)
Dept: CARE COORDINATION | Age: 70
End: 2025-08-01

## 2025-08-01 ENCOUNTER — OFFICE VISIT (OUTPATIENT)
Dept: FAMILY MEDICINE CLINIC | Age: 70
End: 2025-08-01

## 2025-08-01 VITALS
HEIGHT: 65 IN | BODY MASS INDEX: 33.09 KG/M2 | HEART RATE: 81 BPM | SYSTOLIC BLOOD PRESSURE: 136 MMHG | WEIGHT: 198.6 LBS | DIASTOLIC BLOOD PRESSURE: 84 MMHG | OXYGEN SATURATION: 96 %

## 2025-08-01 DIAGNOSIS — I10 ESSENTIAL HYPERTENSION: ICD-10-CM

## 2025-08-01 DIAGNOSIS — E11.9 TYPE 2 DIABETES MELLITUS WITHOUT COMPLICATION, WITHOUT LONG-TERM CURRENT USE OF INSULIN (HCC): Primary | ICD-10-CM

## 2025-08-01 DIAGNOSIS — G51.0 BELL'S PALSY: ICD-10-CM

## 2025-08-01 DIAGNOSIS — E78.00 PURE HYPERCHOLESTEROLEMIA: Chronic | ICD-10-CM

## 2025-08-01 DIAGNOSIS — N39.3 STRESS INCONTINENCE: ICD-10-CM

## 2025-08-01 RX ORDER — OXYBUTYNIN CHLORIDE 10 MG/1
10 TABLET, EXTENDED RELEASE ORAL DAILY
Qty: 90 TABLET | Refills: 3 | Status: SHIPPED | OUTPATIENT
Start: 2025-08-01

## 2025-08-01 RX ORDER — IRBESARTAN AND HYDROCHLOROTHIAZIDE 300; 12.5 MG/1; MG/1
1 TABLET, FILM COATED ORAL DAILY
Qty: 90 TABLET | Refills: 3 | Status: SHIPPED | OUTPATIENT
Start: 2025-08-01

## 2025-08-01 NOTE — CARE COORDINATION
Ambulatory Care Coordination Note     2025 11:33 AM     Patient Current Location:  Home: [address] [place], [place] [id number]     ACM contacted the patient by telephone. Verified name and  with patient as identifiers.         ACM: Mami Garza RN     Challenges to be reviewed by the provider   Additional needs identified to be addressed with provider No  na               Method of communication with provider: none.    Utilization: Patient has not had any utilization since our last call.     Care Summary Note:   Ambulatory care management referral from [facility]. Lives at home with . Independent with ADL's. Both she and  drive. Very active.   ED for left side facial droop. Dx with Bell's Palsy. Dealing with ear infection prior. Started prednisone,eye drops, and eye ointment. Numbness and movement improving. No choking or swallowing problems. Able to close eye lid.   Continues to use lymphatic brush massager.   Follow up with TM today. OT referral placed.      Plan -   OT referral  Eye drops and ointment drops  Prednisone  Early symptom recognition and reporting to prevent ED and admissions  Use acute care appts with PCP office for non-emergency problems    Offered patient enrollment in the Remote Patient Monitoring (RPM) program for in-home monitoring: Yes, but did not enroll at this time: controlled chronic disease management.     Assessments Completed:   Care Coordination Interventions    Referral from Primary Care Provider: No  Suggested Interventions and Community Resources      ,   Diabetes Assessment      Meal Planning: Avoidance of concentrated sweets   How often do you test your blood sugar?: Daily   Do you have barriers with adherence to non-pharmacologic self-management interventions? (Nutrition/Exercise/Self-Monitoring): No   Have you ever had to go to the ED for symptoms of low blood sugar?: No       No patient-reported symptoms   Do you have hyperglycemia symptoms?: No   Do

## 2025-08-01 NOTE — PROGRESS NOTES
[facility]  [address]  [facility] [address]  Dept: [phone]  Dept Fax: [phone]  Loc: [phone]     Visit Date:  8/1/2025    Provider: DARIUSZ Gary CNP        Patient:  Lisa Miguel  YOB: 1955    HPI:     Chief Complaint   Patient presents with    Follow-up     6 month follow up for prescriptions and possible DM, mammo.        History of Present Illness  The patient is a 70-year-old female who presents today for follow-up on Bell's palsy, diabetes, hyperlipidemia, and hypertension.    She has been researching Marietta Hunt syndrome, which she believes may be the cause of her ear pain. She reports no sores or other symptoms associated with this condition. She has completed her course of steroids and antivirals for Bell's palsy. Her  assists her in taping her eye closed during sleep to prevent dryness. She also uses eye drops and ointment at night. She experiences jaw pain, which she attributes to Bell's palsy, and feels a pulling sensation towards the end of the day. She is considering therapy as a potential treatment option.    She does not regularly monitor her blood pressure at home unless she feels unwell. She recalls two recent instances of high blood pressure, which she attributes to stress. However, her blood pressure was within normal range today. She reports no chest pain or swelling but does experience occasional palpitations, which she believes are due to anxiety.    She is seeking refills for Avalide and oxybutynin.    She has been experiencing soreness in her ears due to the use of cotton plugs and would like them examined.    She has recently started volunteering at a hospital and needs some paperwork completed. She plans to take an additional two weeks off work.    Occupations: Volunteering at a hospital, answering the phone in ICU       Medications    Current Outpatient

## 2025-08-04 ENCOUNTER — CARE COORDINATION (OUTPATIENT)
Dept: CARE COORDINATION | Age: 70
End: 2025-08-04

## 2025-08-07 ENCOUNTER — TELEPHONE (OUTPATIENT)
Dept: FAMILY MEDICINE CLINIC | Age: 70
End: 2025-08-07

## 2025-08-07 DIAGNOSIS — G51.0 BELL'S PALSY: Primary | ICD-10-CM

## 2025-08-18 ENCOUNTER — CARE COORDINATION (OUTPATIENT)
Dept: CARE COORDINATION | Age: 70
End: 2025-08-18

## 2025-08-25 ENCOUNTER — CARE COORDINATION (OUTPATIENT)
Dept: CARE COORDINATION | Age: 70
End: 2025-08-25

## (undated) DEVICE — BIOGUARD A/W CLEANING ADAPTER